# Patient Record
Sex: MALE | Race: OTHER | NOT HISPANIC OR LATINO | URBAN - METROPOLITAN AREA
[De-identification: names, ages, dates, MRNs, and addresses within clinical notes are randomized per-mention and may not be internally consistent; named-entity substitution may affect disease eponyms.]

---

## 2019-10-28 ENCOUNTER — OUTPATIENT (OUTPATIENT)
Dept: OUTPATIENT SERVICES | Facility: HOSPITAL | Age: 54
LOS: 1 days | End: 2019-10-28
Payer: MEDICAID

## 2019-10-28 VITALS
WEIGHT: 147.05 LBS | TEMPERATURE: 98 F | HEIGHT: 68 IN | DIASTOLIC BLOOD PRESSURE: 90 MMHG | OXYGEN SATURATION: 100 % | SYSTOLIC BLOOD PRESSURE: 142 MMHG | HEART RATE: 75 BPM | RESPIRATION RATE: 16 BRPM

## 2019-10-28 DIAGNOSIS — Z95.5 PRESENCE OF CORONARY ANGIOPLASTY IMPLANT AND GRAFT: Chronic | ICD-10-CM

## 2019-10-28 DIAGNOSIS — Z01.818 ENCOUNTER FOR OTHER PREPROCEDURAL EXAMINATION: ICD-10-CM

## 2019-10-28 DIAGNOSIS — I10 ESSENTIAL (PRIMARY) HYPERTENSION: ICD-10-CM

## 2019-10-28 DIAGNOSIS — E11.9 TYPE 2 DIABETES MELLITUS WITHOUT COMPLICATIONS: ICD-10-CM

## 2019-10-28 DIAGNOSIS — N18.6 END STAGE RENAL DISEASE: ICD-10-CM

## 2019-10-28 DIAGNOSIS — Z95.5 PRESENCE OF CORONARY ANGIOPLASTY IMPLANT AND GRAFT: ICD-10-CM

## 2019-10-28 DIAGNOSIS — Z95.828 PRESENCE OF OTHER VASCULAR IMPLANTS AND GRAFTS: Chronic | ICD-10-CM

## 2019-10-28 PROCEDURE — 87641 MR-STAPH DNA AMP PROBE: CPT

## 2019-10-28 PROCEDURE — 71045 X-RAY EXAM CHEST 1 VIEW: CPT

## 2019-10-28 PROCEDURE — G0463: CPT

## 2019-10-28 PROCEDURE — 71045 X-RAY EXAM CHEST 1 VIEW: CPT | Mod: 26

## 2019-10-28 PROCEDURE — 87640 STAPH A DNA AMP PROBE: CPT

## 2019-10-28 RX ORDER — SODIUM CHLORIDE 9 MG/ML
3 INJECTION INTRAMUSCULAR; INTRAVENOUS; SUBCUTANEOUS EVERY 8 HOURS
Refills: 0 | Status: DISCONTINUED | OUTPATIENT
Start: 2019-11-01 | End: 2019-11-17

## 2019-10-28 NOTE — H&P PST ADULT - NSICDXPROBLEM_GEN_ALL_CORE_FT
PROBLEM DIAGNOSES  Problem: End stage renal disease  Assessment and Plan: Right Arm Arterio venous Fistula Radiocephalic Creation    Problem: Hypertension  Assessment and Plan: Continue all BP medications as prescribed    Problem: Diabetes  Assessment and Plan:     Problem: Coronary stent patent  Assessment and Plan: Continue ASA as previously ordered

## 2019-10-28 NOTE — H&P PST ADULT - HISTORY OF PRESENT ILLNESS
55 yo male with history of HTN, Heart Attack, Stroke 2 years with residual right sided weakness, DM, Heartburn,  HA, reports the above. He is now receiving HD via Right chest Catheter. He is scheduled for Right Arm Arteriovenous Fistula Radiocephalic Creation on 11/1/19

## 2019-10-28 NOTE — H&P PST ADULT - NSICDXPASTMEDICALHX_GEN_ALL_CORE_FT
PAST MEDICAL HISTORY:  Diabetes     End stage renal disease     H/O heartburn     Heart attack     Hypertension DM sister    Stroke

## 2019-10-29 LAB
MRSA PCR RESULT.: SIGNIFICANT CHANGE UP
S AUREUS DNA NOSE QL NAA+PROBE: SIGNIFICANT CHANGE UP

## 2019-11-01 ENCOUNTER — OUTPATIENT (OUTPATIENT)
Dept: OUTPATIENT SERVICES | Facility: HOSPITAL | Age: 54
LOS: 1 days | End: 2019-11-01
Payer: MEDICAID

## 2019-11-01 VITALS
HEIGHT: 68 IN | RESPIRATION RATE: 16 BRPM | HEART RATE: 75 BPM | OXYGEN SATURATION: 100 % | SYSTOLIC BLOOD PRESSURE: 114 MMHG | TEMPERATURE: 98 F | WEIGHT: 147.05 LBS | DIASTOLIC BLOOD PRESSURE: 77 MMHG

## 2019-11-01 VITALS
OXYGEN SATURATION: 100 % | RESPIRATION RATE: 17 BRPM | TEMPERATURE: 98 F | DIASTOLIC BLOOD PRESSURE: 74 MMHG | HEART RATE: 74 BPM | SYSTOLIC BLOOD PRESSURE: 125 MMHG

## 2019-11-01 DIAGNOSIS — Z01.818 ENCOUNTER FOR OTHER PREPROCEDURAL EXAMINATION: ICD-10-CM

## 2019-11-01 DIAGNOSIS — N18.6 END STAGE RENAL DISEASE: ICD-10-CM

## 2019-11-01 DIAGNOSIS — Z95.5 PRESENCE OF CORONARY ANGIOPLASTY IMPLANT AND GRAFT: Chronic | ICD-10-CM

## 2019-11-01 DIAGNOSIS — Z95.828 PRESENCE OF OTHER VASCULAR IMPLANTS AND GRAFTS: Chronic | ICD-10-CM

## 2019-11-01 LAB
ANION GAP SERPL CALC-SCNC: 8 MMOL/L — SIGNIFICANT CHANGE UP (ref 5–17)
BUN SERPL-MCNC: 15 MG/DL — SIGNIFICANT CHANGE UP (ref 7–18)
CALCIUM SERPL-MCNC: 8.8 MG/DL — SIGNIFICANT CHANGE UP (ref 8.4–10.5)
CHLORIDE SERPL-SCNC: 98 MMOL/L — SIGNIFICANT CHANGE UP (ref 96–108)
CO2 SERPL-SCNC: 30 MMOL/L — SIGNIFICANT CHANGE UP (ref 22–31)
CREAT SERPL-MCNC: 3.97 MG/DL — HIGH (ref 0.5–1.3)
GLUCOSE BLDC GLUCOMTR-MCNC: 114 MG/DL — HIGH (ref 70–99)
GLUCOSE BLDC GLUCOMTR-MCNC: 186 MG/DL — HIGH (ref 70–99)
GLUCOSE SERPL-MCNC: 169 MG/DL — HIGH (ref 70–99)
POTASSIUM SERPL-MCNC: 4.4 MMOL/L — SIGNIFICANT CHANGE UP (ref 3.5–5.3)
POTASSIUM SERPL-SCNC: 4.4 MMOL/L — SIGNIFICANT CHANGE UP (ref 3.5–5.3)
SODIUM SERPL-SCNC: 136 MMOL/L — SIGNIFICANT CHANGE UP (ref 135–145)

## 2019-11-01 PROCEDURE — 82962 GLUCOSE BLOOD TEST: CPT

## 2019-11-01 PROCEDURE — C1889: CPT

## 2019-11-01 PROCEDURE — 80048 BASIC METABOLIC PNL TOTAL CA: CPT

## 2019-11-01 PROCEDURE — 36415 COLL VENOUS BLD VENIPUNCTURE: CPT

## 2019-11-01 PROCEDURE — 36821 AV FUSION DIRECT ANY SITE: CPT | Mod: RT

## 2019-11-01 RX ORDER — CHLORHEXIDINE GLUCONATE 213 G/1000ML
1 SOLUTION TOPICAL ONCE
Refills: 0 | Status: COMPLETED | OUTPATIENT
Start: 2019-11-01 | End: 2019-11-01

## 2019-11-01 RX ORDER — CIPROFLOXACIN LACTATE 400MG/40ML
1 VIAL (ML) INTRAVENOUS
Qty: 0 | Refills: 0 | DISCHARGE

## 2019-11-01 RX ORDER — OXYCODONE AND ACETAMINOPHEN 5; 325 MG/1; MG/1
1 TABLET ORAL ONCE
Refills: 0 | Status: DISCONTINUED | OUTPATIENT
Start: 2019-11-01 | End: 2019-11-01

## 2019-11-01 RX ORDER — SODIUM CHLORIDE 9 MG/ML
1000 INJECTION INTRAMUSCULAR; INTRAVENOUS; SUBCUTANEOUS
Refills: 0 | Status: DISCONTINUED | OUTPATIENT
Start: 2019-11-01 | End: 2019-11-01

## 2019-11-01 RX ADMIN — SODIUM CHLORIDE 3 MILLILITER(S): 9 INJECTION INTRAMUSCULAR; INTRAVENOUS; SUBCUTANEOUS at 08:34

## 2019-11-01 RX ADMIN — CHLORHEXIDINE GLUCONATE 1 APPLICATION(S): 213 SOLUTION TOPICAL at 08:33

## 2019-11-01 NOTE — ASU PATIENT PROFILE, ADULT - PMH
Diabetes    End stage renal disease    H/O heartburn    Heart attack    Hypertension  DM sister  Stroke

## 2019-11-01 NOTE — ASU DISCHARGE PLAN (ADULT/PEDIATRIC) - CARE PROVIDER_API CALL
Monroe Escudero (MD)  Surgery; Thoracic Surgery; Vascular Surgery  [address] [facility], [address]  [place] [id number]  Phone: [phone]  Fax: [phone]  Follow Up Time:

## 2019-11-01 NOTE — BRIEF OPERATIVE NOTE - OPERATION/FINDINGS
Right radiocheplic fistula creation. Good thrill palpable at end of case. Good hemostasis. Closed with simple interrupted sutures.

## 2021-01-29 PROBLEM — Z00.00 ENCOUNTER FOR PREVENTIVE HEALTH EXAMINATION: Status: ACTIVE | Noted: 2021-01-29

## 2021-01-30 PROBLEM — Z87.898 PERSONAL HISTORY OF OTHER SPECIFIED CONDITIONS: Chronic | Status: ACTIVE | Noted: 2019-10-28

## 2021-01-30 PROBLEM — I21.9 ACUTE MYOCARDIAL INFARCTION, UNSPECIFIED: Chronic | Status: ACTIVE | Noted: 2019-10-28

## 2021-02-17 ENCOUNTER — APPOINTMENT (OUTPATIENT)
Dept: TRANSPLANT | Facility: CLINIC | Age: 56
End: 2021-02-17
Payer: COMMERCIAL

## 2021-02-17 ENCOUNTER — APPOINTMENT (OUTPATIENT)
Dept: NEPHROLOGY | Facility: CLINIC | Age: 56
End: 2021-02-17
Payer: COMMERCIAL

## 2021-02-17 VITALS
TEMPERATURE: 97.2 F | DIASTOLIC BLOOD PRESSURE: 60 MMHG | WEIGHT: 158 LBS | SYSTOLIC BLOOD PRESSURE: 177 MMHG | BODY MASS INDEX: 23.95 KG/M2 | HEIGHT: 68 IN | RESPIRATION RATE: 15 BRPM | HEART RATE: 82 BPM

## 2021-02-17 PROCEDURE — 99072 ADDL SUPL MATRL&STAF TM PHE: CPT

## 2021-02-17 PROCEDURE — 99204 OFFICE O/P NEW MOD 45 MIN: CPT

## 2021-02-17 PROCEDURE — 99205 OFFICE O/P NEW HI 60 MIN: CPT

## 2021-02-17 NOTE — CONSULT LETTER
[Dear  ___] : Dear  [unfilled], [Consult Letter:] : I had the pleasure of evaluating your patient, [unfilled]. [Please see my note below.] : Please see my note below. [Consult Closing:] : Thank you very much for allowing me to participate in the care of this patient.  If you have any questions, please do not hesitate to contact me. [Sincerely,] : Sincerely, [FreeTextEntry2] : Derrell Cobb MD [FreeTextEntry3] : Antwon

## 2021-02-17 NOTE — REASON FOR VISIT
[Initial] : an initial visit for [Kidney Transplant Evaluation] : kidney transplant evaluation [FreeTextEntry2] : Derrell Cobb MD

## 2021-02-17 NOTE — REVIEW OF SYSTEMS
[Fever] : no fever [Chills] : no chills [Fatigue] : fatigue [Night Sweats] : no night sweats [Recent Weight Gain (___ Lbs)] : no recent weight gain [Recent Weight Loss (___ Lbs)] : no recent weight loss [Sore throat] : no sore throat [Pain/Stiffness] : no pain/stiffness [Rhinorrhea] : no rhinorrhea [Trauma] : no trauma [Sclera anicteric] : sclera icteric [Double vision] : no double vision [Blurred Vision] : no blurred vision [Eye Pain] : no eye pain [Wears glasses] : wears glasses [Chest Pain] : no chest pain [Palpitations] : no palpitations [Can Walk (___ Blocks)] : can walk [unfilled] blocks [SOB] : no shortness of breath [Wheezing] : no wheezing [Cough] : no cough [Dyspnea on Exertion] : dyspnea on exertion [Pleuritic Chest Pain] : no pleuritic chest pain [Abdominal Pain] : no abdominal pain [Nausea] : no nausea [Constipation] : no constipation [Diarrhea] : diarrhea [Vomiting] : no vomiting [Dysuria] : no dysuria [Frequency] : no frequency [Urgency] : no urinary urgency [Incontinence] : no incontinence [Hematuria] : no hematuria [UTI] : no UTI [Urine Output: ____] : Urine Output: [unfilled] [Joint Pain] : no joint pain [Joint Stiffness] : no joint stiffness [Muscle Pain] : no muscle pain [Muscle Weakness] : no muscle weakness [Tattoos] : no tattoos [Itching] : no itching [Skin Rash] : no skin rash [Hair Changes] : no hair changes [Headache] : no headache [Dizziness] : dizziness [Fainting] : no fainting [Confusion] : no confusion [Memory Loss] : no memory loss [Unsteady Gait] : steady gait [Seizures] : no seizures [Suicidal] : not suicidal [Hallucinations] : no hallucinations [Anxiety] : no anxiety [Depression] : no depression [Anemia] : anemia [Adenopathy] : no adenopathy [Easy Bleeding] : no easy bleeding [Easy Bruising] : no easy bruising

## 2021-02-17 NOTE — ASSESSMENT
[Fair candidate] : a fair candidate. We should proceed with our protocol for evaluation for kidney transplantation.

## 2021-02-17 NOTE — PLAN
[FreeTextEntry1] : 1. ESRD:  Mr. MAMI WOODWARD  Is a  fair candidate for kidney transplantation and would benefit from transplantation . no potential living donors. \par 2.Cardiac risk: h/o MI s/p PCI in 2011 .  needs an  echo, stress test and cardiac evaluation \par 3. Cancer screening:  colonoscopy, and check PSA \par 4. ID: Serology for acute and chronic viral infections. Screening for latent TB\par 5. Imaging: Renal/abdominal /chest /Iliac imaging. \par 6.Diabetes Mellitus:check HbA1c \par 7.Hypertension- f/u PMD \par \par I have personally discussed the risks and benefits of transplantation and patient attended transplant education class where the following was disclosed:\par  \par Reviewed factors affecting survival and morbidity while on dialysis, the transplant wait list and reviewed zackery-operative and long-term risk factors affecting outcome in kidney transplantation. \par  \par One year SRTR outcomes for national and [facility] were discussed in regards to patient survival and graft survival after transplantation. \par  \par Details of transplant surgery, including complications were discussed.\par Immunosuppression and complications including infection including life threatening sepsis and opportunistic infections, malignancy and new onset diabetes were discussed. \par  \par Benefits of live donor transplantation as well as variability in wait times across regions and multiple listing were discussed. \par KDPI >85% and PHS high risk criteria donors were discussed. \par HCV kidney transplantation was discussed.\par  \par Will proceed with completing/ updating work up and listing for transplant/ live donor transplant once work up is reviewed and found to be acceptable by multidisciplinary listing committee.\par

## 2021-02-17 NOTE — PHYSICAL EXAM
[Well Developed] : well developed [Well Nourished] : well nourished [No Acute Distress] : no acute distress [Normocephalic] : normocephalic [Atraumatic] : atraumatic [Sclera Anicteric] : sclera anicteric [Neck Supple] : neck supple [Clear to Auscultation] : clear to auscultation [Breathing Comfortably on RA] : breathing comfortably on room air [Normal Rate] : normal rate [Regular Rhythm] : regular rhythm [Soft] : soft [Non-tender] : non-tender [In Right Forearm] : fistula/graft in right forearm [] : right dorsalis pedis non-palpable [Alert] : alert [Responds to Questions Appropriately] : responds to questions appropriately [Appropriate] : appropriate [FreeTextEntry1] : NO carotid bruits [TextBox_34] : No ulcers or edema [TextBox_73] : P [TextBox_80] : Patient was not completely familiar with his age although he was able to correctly state his year of birth [TextBox_86] : No adenopathy

## 2021-02-17 NOTE — HISTORY OF PRESENT ILLNESS
[TextBox_42] : MAMI WOODWARD is a 55 year old male who presents for kidney transplant evaluation. \par Cause of ESRD : long standing DM. Known  CKD for 5 years. \par Nephrologist: Dr Cobb   ,  on IHD for 2018 ,  Access: [facility].   \par reports that he still makes good amounts of urine. \par  \par Other PMH :\par Cardiac - HTN X 20 years. h/o  MI X s/p PCI 2011. \par Pulmonary-  no h/o COPD, Asthma\par Infections- no  h/o  TB, HIV, Hepatitis  or covid \par Heme- no h/o malignancy or bleeding disorders.No DVT/PE\par Endo- has diabetes X 15 years. was on insulin, now on Januvia with retinopathy and neuropathy.  no Thyroid disease\par Neuro- h/o CVA in 2011 and 2013  with some residual right sided weakness \par Psych- no suicidal ideation, depression or anxiety. \par Sensitization events- no  previous blood transfusions or previous transplant\par No infections or hospitalizations in the last 6 months \par \par Surgical h/o : none \par \par Functional status: walks with a cane for balance  , can do 5-10 blocks and can climb 3-4 flights of stairs. he can walk without the cane but has balance issues due to balance issues. \par \par Social history: lives with sister. has 1 son who is 38 years old.  Is a non smoker, no alcohol or illicit use. retired , used to repair photocopy machines. \par Family history:  Mother had DM. No family h/o kidney disease or malignancy.\par \par \par \par

## 2021-02-17 NOTE — PHYSICAL EXAM
[General Appearance - Alert] : alert [General Appearance - In No Acute Distress] : in no acute distress [Sclera] : the sclera and conjunctiva were normal [PERRL With Normal Accommodation] : pupils were equal in size, round, and reactive to light [Extraocular Movements] : extraocular movements were intact [Outer Ear] : the ears and nose were normal in appearance [Oropharynx] : the oropharynx was normal [Neck Appearance] : the appearance of the neck was normal [Neck Cervical Mass (___cm)] : no neck mass was observed [Jugular Venous Distention Increased] : there was no jugular-venous distention [Thyroid Diffuse Enlargement] : the thyroid was not enlarged [Thyroid Nodule] : there were no palpable thyroid nodules [Auscultation Breath Sounds / Voice Sounds] : lungs were clear to auscultation bilaterally [Heart Rate And Rhythm] : heart rate was normal and rhythm regular [Heart Sounds] : normal S1 and S2 [Heart Sounds Gallop] : no gallops [Murmurs] : no murmurs [Heart Sounds Pericardial Friction Rub] : no pericardial rub [Bowel Sounds] : normal bowel sounds [Abdomen Soft] : soft [Abdomen Tenderness] : non-tender [] : no hepato-splenomegaly [Abdomen Mass (___ Cm)] : no abdominal mass palpated [No CVA Tenderness] : no ~M costovertebral angle tenderness [No Spinal Tenderness] : no spinal tenderness [Abnormal Walk] : normal gait [Nail Clubbing] : no clubbing  or cyanosis of the fingernails [Musculoskeletal - Swelling] : no joint swelling seen [Motor Tone] : muscle strength and tone were normal [Graft] : graft [Normal] : normal [Deep Tendon Reflexes (DTR)] : deep tendon reflexes were 2+ and symmetric [Sensation] : the sensory exam was normal to light touch and pinprick [No Focal Deficits] : no focal deficits [Oriented To Time, Place, And Person] : oriented to person, place, and time [Impaired Insight] : insight and judgment were intact [Affect] : the affect was normal

## 2021-02-17 NOTE — HISTORY OF PRESENT ILLNESS
[Diabetes Mellitus] : Diabetes Mellitus [Previous Kidney Transplant] : no previous kidney transplant [Cardiac History] : cardiac history [Blood Transfusion] : no prior blood transfusion [Hx of DVT/Thrombosis/Miscarriage] : no history of dvt/thrombosis/miscarriage [Diabetes] : diabetes [Retinopathy] : retinopathy [Neuropathy] : neuropathy [Annual Foot Exams] : no annual foot exams [Lower Extremity Ulcers] : no lower extremity ulcers [Insulin] : no insulin treatment [TextBox_16] : 2017 [TextBox_20] : 2015 [TextBox_24] : At age 19 [TextBox_28] : At age 19 [TextBox_30] : 5 blocks [TextBox_39] : took insulin for approximately 2 years, none now [de-identified] : 2015 - coronary artery stenting\par CVA x 2015 - right sided weakness - residual deficit - walks with cane\par Father:  - age 80 - unknown\par Mother:  - age 42 - DM\par No family history of kidney disease\par Currently not working\par Previously repaired photocopy machines.\par \par 1 son - heallthy\par Smoking: Denies\par Drinking: Denies

## 2021-02-17 NOTE — PLAN
[We should proceed with our protocol for kidney transplantation evaluation.] : We should proceed with our protocol for kidney transplantation evaluation. [TextBox_6] : 1- CT angiogram of aorta and iliacs with runoff - non-palpable DP on right and non-palpable PT on left\par 2- Carotid US - given reported history of CVA x 2.

## 2021-02-19 RX ORDER — PEG-3350, SODIUM SULFATE, SODIUM CHLORIDE, POTASSIUM CHLORIDE, SODIUM ASCORBATE AND ASCORBIC ACID 7.5-2.691G
100 KIT ORAL
Qty: 2 | Refills: 0 | Status: ACTIVE | COMMUNITY
Start: 2021-02-19 | End: 1900-01-01

## 2021-02-22 LAB
ABO + RH PNL BLD: NORMAL
ABO + RH PNL BLD: NORMAL
ALBUMIN SERPL ELPH-MCNC: 5.1 G/DL
ALP BLD-CCNC: 106 U/L
ALT SERPL-CCNC: 14 U/L
ANION GAP SERPL CALC-SCNC: 19 MMOL/L
APPEARANCE: CLEAR
AST SERPL-CCNC: 19 U/L
BACTERIA: NEGATIVE
BASOPHILS # BLD AUTO: 0.12 K/UL
BASOPHILS NFR BLD AUTO: 1.1 %
BILIRUB SERPL-MCNC: 0.5 MG/DL
BILIRUBIN URINE: NEGATIVE
BLOOD URINE: ABNORMAL
BUN SERPL-MCNC: 44 MG/DL
CALCIUM SERPL-MCNC: 9.3 MG/DL
CHLORIDE SERPL-SCNC: 91 MMOL/L
CHOLEST SERPL-MCNC: 144 MG/DL
CMV IGG SERPL QL: 8.3 U/ML
CMV IGG SERPL-IMP: POSITIVE
CO2 SERPL-SCNC: 27 MMOL/L
COLOR: YELLOW
CREAT SERPL-MCNC: 7.31 MG/DL
CREAT SPEC-SCNC: 181 MG/DL
CREAT/PROT UR: 3.4 RATIO
EBV EA AB SER IA-ACNC: <5 U/ML
EBV EA AB TITR SER IF: POSITIVE
EBV EA IGG SER QL IA: 558 U/ML
EBV EA IGG SER-ACNC: NEGATIVE
EBV EA IGM SER IA-ACNC: NEGATIVE
EBV PATRN SPEC IB-IMP: NORMAL
EBV VCA IGG SER IA-ACNC: >750 U/ML
EBV VCA IGM SER QL IA: <10 U/ML
EOSINOPHIL # BLD AUTO: 0.45 K/UL
EOSINOPHIL NFR BLD AUTO: 4.2 %
EPSTEIN-BARR VIRUS CAPSID ANTIGEN IGG: POSITIVE
GLUCOSE QUALITATIVE U: NEGATIVE
GLUCOSE SERPL-MCNC: 125 MG/DL
HAV IGM SER QL: NONREACTIVE
HBV CORE IGG+IGM SER QL: NONREACTIVE
HBV SURFACE AB SER QL: REACTIVE
HBV SURFACE AB SERPL IA-ACNC: 95.5 MIU/ML
HBV SURFACE AG SER QL: NONREACTIVE
HCT VFR BLD CALC: 42.2 %
HCV AB SER QL: NONREACTIVE
HCV S/CO RATIO: 0.28 S/CO
HDLC SERPL-MCNC: 36 MG/DL
HGB BLD-MCNC: 14 G/DL
HIV1+2 AB SPEC QL IA.RAPID: NONREACTIVE
HSV 1+2 IGG SER IA-IMP: NEGATIVE
HSV 1+2 IGG SER IA-IMP: POSITIVE
HSV1 IGG SER QL: 56.2 INDEX
HSV2 IGG SER QL: 0.24 INDEX
HYALINE CASTS: 3 /LPF
IMM GRANULOCYTES NFR BLD AUTO: 0.4 %
KETONES URINE: NEGATIVE
LDLC SERPL CALC-MCNC: 70 MG/DL
LEUKOCYTE ESTERASE URINE: NEGATIVE
LYMPHOCYTES # BLD AUTO: 2.14 K/UL
LYMPHOCYTES NFR BLD AUTO: 19.8 %
M TB IFN-G BLD-IMP: NEGATIVE
MAGNESIUM SERPL-MCNC: 2.6 MG/DL
MAN DIFF?: NORMAL
MCHC RBC-ENTMCNC: 29.8 PG
MCHC RBC-ENTMCNC: 33.2 GM/DL
MCV RBC AUTO: 89.8 FL
MICROSCOPIC-UA: NORMAL
MONOCYTES # BLD AUTO: 1.08 K/UL
MONOCYTES NFR BLD AUTO: 10 %
NEUTROPHILS # BLD AUTO: 6.96 K/UL
NEUTROPHILS NFR BLD AUTO: 64.5 %
NITRITE URINE: NEGATIVE
NONHDLC SERPL-MCNC: 107 MG/DL
PH URINE: 6.5
PHOSPHATE SERPL-MCNC: 4.6 MG/DL
PLATELET # BLD AUTO: 326 K/UL
POTASSIUM SERPL-SCNC: 4.1 MMOL/L
PROT SERPL-MCNC: 8.5 G/DL
PROT UR-MCNC: 614 MG/DL
PROTEIN URINE: ABNORMAL
PSA SERPL-MCNC: 1.58 NG/ML
QUANTIFERON TB PLUS MITOGEN MINUS NIL: 9.96 IU/ML
QUANTIFERON TB PLUS NIL: 0.02 IU/ML
QUANTIFERON TB PLUS TB1 MINUS NIL: 0.01 IU/ML
QUANTIFERON TB PLUS TB2 MINUS NIL: 0.01 IU/ML
RBC # BLD: 4.7 M/UL
RBC # FLD: 12.3 %
RED BLOOD CELLS URINE: 2 /HPF
RUBV IGG FLD-ACNC: 32.6 INDEX
RUBV IGG SER-IMP: POSITIVE
SARS-COV-2 IGG SERPL IA-ACNC: 0.06 INDEX
SARS-COV-2 IGG SERPL QL IA: NEGATIVE
SODIUM SERPL-SCNC: 137 MMOL/L
SPECIFIC GRAVITY URINE: 1.02
SQUAMOUS EPITHELIAL CELLS: 2 /HPF
T GONDII AB SER-IMP: POSITIVE
T GONDII IGG SER QL: 27.7 IU/ML
T PALLIDUM AB SER QL IA: NEGATIVE
TRIGL SERPL-MCNC: 185 MG/DL
UROBILINOGEN URINE: NORMAL
VZV AB TITR SER: POSITIVE
VZV IGG SER IF-ACNC: 3502 INDEX
WBC # FLD AUTO: 10.79 K/UL
WHITE BLOOD CELLS URINE: 4 /HPF

## 2021-02-23 LAB — EBV DNA SERPL NAA+PROBE-ACNC: NOT DETECTED IU/ML

## 2021-03-17 ENCOUNTER — APPOINTMENT (OUTPATIENT)
Dept: CT IMAGING | Facility: CLINIC | Age: 56
End: 2021-03-17

## 2021-03-17 ENCOUNTER — OUTPATIENT (OUTPATIENT)
Dept: OUTPATIENT SERVICES | Facility: HOSPITAL | Age: 56
LOS: 1 days | End: 2021-03-17
Payer: COMMERCIAL

## 2021-03-17 ENCOUNTER — RESULT REVIEW (OUTPATIENT)
Age: 56
End: 2021-03-17

## 2021-03-17 ENCOUNTER — APPOINTMENT (OUTPATIENT)
Dept: ULTRASOUND IMAGING | Facility: CLINIC | Age: 56
End: 2021-03-17

## 2021-03-17 DIAGNOSIS — Z01.818 ENCOUNTER FOR OTHER PREPROCEDURAL EXAMINATION: ICD-10-CM

## 2021-03-17 DIAGNOSIS — Z95.5 PRESENCE OF CORONARY ANGIOPLASTY IMPLANT AND GRAFT: Chronic | ICD-10-CM

## 2021-03-17 DIAGNOSIS — Z95.828 PRESENCE OF OTHER VASCULAR IMPLANTS AND GRAFTS: Chronic | ICD-10-CM

## 2021-03-17 PROCEDURE — 93880 EXTRACRANIAL BILAT STUDY: CPT

## 2021-03-17 PROCEDURE — 93880 EXTRACRANIAL BILAT STUDY: CPT | Mod: 26

## 2021-03-17 PROCEDURE — 75635 CT ANGIO ABDOMINAL ARTERIES: CPT | Mod: 26

## 2021-03-17 PROCEDURE — 71250 CT THORAX DX C-: CPT | Mod: 26

## 2021-03-17 PROCEDURE — 74177 CT ABD & PELVIS W/CONTRAST: CPT

## 2021-03-17 PROCEDURE — 71250 CT THORAX DX C-: CPT

## 2021-03-17 PROCEDURE — 75635 CT ANGIO ABDOMINAL ARTERIES: CPT

## 2021-03-17 PROCEDURE — 74177 CT ABD & PELVIS W/CONTRAST: CPT | Mod: 26

## 2021-03-22 ENCOUNTER — APPOINTMENT (OUTPATIENT)
Dept: CARDIOLOGY | Facility: CLINIC | Age: 56
End: 2021-03-22
Payer: COMMERCIAL

## 2021-03-22 ENCOUNTER — NON-APPOINTMENT (OUTPATIENT)
Age: 56
End: 2021-03-22

## 2021-03-22 VITALS — DIASTOLIC BLOOD PRESSURE: 90 MMHG | SYSTOLIC BLOOD PRESSURE: 160 MMHG

## 2021-03-22 VITALS
DIASTOLIC BLOOD PRESSURE: 100 MMHG | WEIGHT: 161 LBS | SYSTOLIC BLOOD PRESSURE: 197 MMHG | OXYGEN SATURATION: 99 % | BODY MASS INDEX: 24.48 KG/M2 | HEART RATE: 78 BPM

## 2021-03-22 DIAGNOSIS — I67.9 CEREBROVASCULAR DISEASE, UNSPECIFIED: ICD-10-CM

## 2021-03-22 PROCEDURE — 99072 ADDL SUPL MATRL&STAF TM PHE: CPT

## 2021-03-22 PROCEDURE — 93000 ELECTROCARDIOGRAM COMPLETE: CPT

## 2021-03-22 PROCEDURE — 99204 OFFICE O/P NEW MOD 45 MIN: CPT

## 2021-03-22 NOTE — REVIEW OF SYSTEMS
[Feeling Fatigued] : feeling fatigued [Dyspnea on exertion] : dyspnea during exertion [Limb Weakness (Paresis)] : limb weakness [Numbness (Hypesthesia)] : numbness [Memory Lapses Or Loss] : memory lapses or loss [Negative] : Heme/Lymph

## 2021-03-24 ENCOUNTER — OUTPATIENT (OUTPATIENT)
Dept: OUTPATIENT SERVICES | Facility: HOSPITAL | Age: 56
LOS: 1 days | End: 2021-03-24
Payer: COMMERCIAL

## 2021-03-24 ENCOUNTER — NON-APPOINTMENT (OUTPATIENT)
Age: 56
End: 2021-03-24

## 2021-03-24 VITALS
OXYGEN SATURATION: 99 % | HEIGHT: 68 IN | DIASTOLIC BLOOD PRESSURE: 88 MMHG | TEMPERATURE: 98 F | WEIGHT: 160.06 LBS | RESPIRATION RATE: 16 BRPM | SYSTOLIC BLOOD PRESSURE: 158 MMHG | HEART RATE: 86 BPM

## 2021-03-24 DIAGNOSIS — Z95.828 PRESENCE OF OTHER VASCULAR IMPLANTS AND GRAFTS: Chronic | ICD-10-CM

## 2021-03-24 DIAGNOSIS — Z01.818 ENCOUNTER FOR OTHER PREPROCEDURAL EXAMINATION: ICD-10-CM

## 2021-03-24 DIAGNOSIS — Z95.5 PRESENCE OF CORONARY ANGIOPLASTY IMPLANT AND GRAFT: Chronic | ICD-10-CM

## 2021-03-24 DIAGNOSIS — Z98.890 OTHER SPECIFIED POSTPROCEDURAL STATES: Chronic | ICD-10-CM

## 2021-03-24 LAB
ANION GAP SERPL CALC-SCNC: 20 MMOL/L — HIGH (ref 5–17)
BUN SERPL-MCNC: 42 MG/DL — HIGH (ref 7–23)
CALCIUM SERPL-MCNC: 9.5 MG/DL — SIGNIFICANT CHANGE UP (ref 8.4–10.5)
CHLORIDE SERPL-SCNC: 94 MMOL/L — LOW (ref 96–108)
CO2 SERPL-SCNC: 25 MMOL/L — SIGNIFICANT CHANGE UP (ref 22–31)
CREAT SERPL-MCNC: 6.7 MG/DL — HIGH (ref 0.5–1.3)
GLUCOSE SERPL-MCNC: 82 MG/DL — SIGNIFICANT CHANGE UP (ref 70–99)
HCT VFR BLD CALC: 40.2 % — SIGNIFICANT CHANGE UP (ref 39–50)
HGB BLD-MCNC: 13.2 G/DL — SIGNIFICANT CHANGE UP (ref 13–17)
MCHC RBC-ENTMCNC: 29.3 PG — SIGNIFICANT CHANGE UP (ref 27–34)
MCHC RBC-ENTMCNC: 32.8 GM/DL — SIGNIFICANT CHANGE UP (ref 32–36)
MCV RBC AUTO: 89.3 FL — SIGNIFICANT CHANGE UP (ref 80–100)
NRBC # BLD: 0 /100 WBCS — SIGNIFICANT CHANGE UP (ref 0–0)
PLATELET # BLD AUTO: 356 K/UL — SIGNIFICANT CHANGE UP (ref 150–400)
POTASSIUM SERPL-MCNC: 4.1 MMOL/L — SIGNIFICANT CHANGE UP (ref 3.5–5.3)
POTASSIUM SERPL-SCNC: 4.1 MMOL/L — SIGNIFICANT CHANGE UP (ref 3.5–5.3)
RBC # BLD: 4.5 M/UL — SIGNIFICANT CHANGE UP (ref 4.2–5.8)
RBC # FLD: 12.3 % — SIGNIFICANT CHANGE UP (ref 10.3–14.5)
SODIUM SERPL-SCNC: 139 MMOL/L — SIGNIFICANT CHANGE UP (ref 135–145)
WBC # BLD: 9.25 K/UL — SIGNIFICANT CHANGE UP (ref 3.8–10.5)
WBC # FLD AUTO: 9.25 K/UL — SIGNIFICANT CHANGE UP (ref 3.8–10.5)

## 2021-03-24 PROCEDURE — G0463: CPT

## 2021-03-24 PROCEDURE — 85027 COMPLETE CBC AUTOMATED: CPT

## 2021-03-24 PROCEDURE — 83036 HEMOGLOBIN GLYCOSYLATED A1C: CPT

## 2021-03-24 PROCEDURE — 80048 BASIC METABOLIC PNL TOTAL CA: CPT

## 2021-03-24 RX ORDER — NIFEDIPINE 30 MG
1 TABLET, EXTENDED RELEASE 24 HR ORAL
Qty: 0 | Refills: 0 | DISCHARGE

## 2021-03-24 RX ORDER — FUROSEMIDE 40 MG
1 TABLET ORAL
Qty: 0 | Refills: 0 | DISCHARGE

## 2021-03-24 RX ORDER — CALCIUM ACETATE 667 MG
3 TABLET ORAL
Qty: 0 | Refills: 0 | DISCHARGE

## 2021-03-24 RX ORDER — CARVEDILOL PHOSPHATE 80 MG/1
1 CAPSULE, EXTENDED RELEASE ORAL
Qty: 0 | Refills: 0 | DISCHARGE

## 2021-03-24 RX ORDER — ACETAMINOPHEN 500 MG
2 TABLET ORAL
Qty: 0 | Refills: 0 | DISCHARGE

## 2021-03-24 RX ORDER — INSULIN DETEMIR 100/ML (3)
10 INSULIN PEN (ML) SUBCUTANEOUS
Qty: 0 | Refills: 0 | DISCHARGE

## 2021-03-24 NOTE — H&P PST ADULT - NSICDXPASTMEDICALHX_GEN_ALL_CORE_FT
PAST MEDICAL HISTORY:  Diabetes     End stage renal disease     H/O heartburn     Heart attack     Hypertension DM sister    Stroke PAST MEDICAL HISTORY:  Diabetes     End stage renal disease     H/O heartburn     Heart attack     Hypertension DM sister    Stroke      PAST MEDICAL HISTORY:  Diabetes     End stage renal disease HD on Tu-Th-Sat    H/O heartburn     Heart attack     Hypertension     Stroke

## 2021-03-24 NOTE — H&P PST ADULT - NSICDXPASTSURGICALHX_GEN_ALL_CORE_FT
PAST SURGICAL HISTORY:  S/P dialysis catheter insertion Right chest    Stented coronary artery PAST SURGICAL HISTORY:  S/P dialysis catheter insertion Right chest since removed    Stented coronary artery 2011     PAST SURGICAL HISTORY:  S/P arteriovenous (AV) fistula creation 2019    S/P dialysis catheter insertion Right chest since removed    Stented coronary artery 2011

## 2021-03-24 NOTE — H&P PST ADULT - HISTORY OF PRESENT ILLNESS
53 yo male with history of HTN, Heart Attack, Stroke 2 years with residual right sided weakness, DM, Heartburn,  HA, reports the above. He is now receiving HD via Right chest Catheter. He is scheduled for Right Arm Arteriovenous Fistula Radiocephalic Creation on 11/1/19 55 yo male with history of HTN, Heart Attack, Stroke 2 years with residual right sided weakness, DM, Heartburn,  HA, reports the above. He is now receiving HD via Right chest Catheter. He is scheduled for Right Arm Arteriovenous Fistula Radiocephalic Creation on 11/1/19     [facility]-Th-Sat  55 yo male with history of HTN, CAD s/p MI, s/p PCI with stent placement, on Plavix and ASA, s/p CVA with residual right sided weakness, DM 9last A1c unknown), GERD, with ESRD on HD (Via right AV fistula), being evaluated for a kidney transplant. Reporting some forgetfulness, he states he's getting put on the list for a kidney transplant and does not why he is here in PST or for what procedure he's going for. Patient came alone today by transport. Procedure and date of procedure, confirmed with surgical coordinator. Patient is scheduled for colonoscopy anesthesia on 4/7 with Dr. Ospina. Procedure also confirmed with sister Dianne Caballero, as well.     ***  Appt for COVID PCR made by PST for 4/4 @ 10:05. Sister made aware

## 2021-03-24 NOTE — H&P PST ADULT - DENTITION
Missing teeth bilateral upper and lower mouth Missing teeth bilateral upper and lower mouth; Denies dentures, broken and loose teeth

## 2021-03-25 LAB
A1C WITH ESTIMATED AVERAGE GLUCOSE RESULT: 6 % — HIGH (ref 4–5.6)
ESTIMATED AVERAGE GLUCOSE: 126 MG/DL — HIGH (ref 68–114)

## 2021-03-26 ENCOUNTER — APPOINTMENT (OUTPATIENT)
Dept: CARDIOLOGY | Facility: CLINIC | Age: 56
End: 2021-03-26
Payer: COMMERCIAL

## 2021-03-26 PROCEDURE — 93306 TTE W/DOPPLER COMPLETE: CPT

## 2021-03-26 PROCEDURE — 99072 ADDL SUPL MATRL&STAF TM PHE: CPT

## 2021-03-29 ENCOUNTER — APPOINTMENT (OUTPATIENT)
Dept: VASCULAR SURGERY | Facility: CLINIC | Age: 56
End: 2021-03-29
Payer: MEDICARE

## 2021-03-29 VITALS
SYSTOLIC BLOOD PRESSURE: 160 MMHG | TEMPERATURE: 97.4 F | HEART RATE: 83 BPM | DIASTOLIC BLOOD PRESSURE: 77 MMHG | WEIGHT: 175 LBS | HEIGHT: 68 IN | BODY MASS INDEX: 26.52 KG/M2

## 2021-03-29 PROCEDURE — 99072 ADDL SUPL MATRL&STAF TM PHE: CPT

## 2021-03-29 PROCEDURE — 99204 OFFICE O/P NEW MOD 45 MIN: CPT

## 2021-03-29 NOTE — PHYSICAL EXAM
[de-identified] : On physical examination the patient is in no acute distress and neurologically intact. The lungs are clear to auscultation and the heart has a regular rate and rhythm. Abdomen is benign. Bilateral femoral and pedal pulses are palpable. \par \par

## 2021-03-29 NOTE — HISTORY OF PRESENT ILLNESS
[FreeTextEntry1] : I have just had the pleasure of seeing Mr. Osiris Ashby in consultation for severe right carotid artery stenosis. History is primarily obtained from sister Dianne, as patient is poor historian.\par  \par Mr. Ashby is a pleasant 55-year-old gentleman with a history of stroke 3-4 years ago, treated at [facility] in [place] which resulted in right sided weakness and difficulty with memory and swallowing. Patient unsure of type of stroke or treatment received. He has largely recovered motor function but still reports some difficulty with fine motor skills in the right hand and swallowing. His sister reports problems with memory. He is being evaluated for renal transplant and as part of his workup, he underwent a carotid duplex which showed >70% right carotid stenosis and pre-obstructive flow pattern in the right vertebral artery. \par \par His medical history is significant for ESRD on HD via right arm fistula, CAD s/p PCI/stents, HTN, and HLD\par \par Medications: Lipitor, Auryxia, Coreg, Plavix, Famotidine, Lasix, Hydralazine, Januvia, Nifedipine, Aspirin (325mg), Nebulizers\par \par Allergies: NKDA\par \par Social history: Non-smoker\par \par FH: NC\par

## 2021-03-29 NOTE — ASSESSMENT
[FreeTextEntry1] : In summary, Mr. Ashby presents with asymptomatic right carotid artery stenosis. I will send him for a CTA head/neck to better delineate his anatomy. I will attempt to obtain medical records from [facility] regarding his prior stroke. \par \par Thank you for allowing me to participate in the care of this nice man. Please do not hesitate to contact me with any questions. \par   \par

## 2021-03-30 PROBLEM — I10 ESSENTIAL (PRIMARY) HYPERTENSION: Chronic | Status: ACTIVE | Noted: 2019-10-28

## 2021-03-31 ENCOUNTER — APPOINTMENT (OUTPATIENT)
Dept: CARDIOLOGY | Facility: CLINIC | Age: 56
End: 2021-03-31
Payer: COMMERCIAL

## 2021-03-31 PROCEDURE — 93015 CV STRESS TEST SUPVJ I&R: CPT

## 2021-03-31 PROCEDURE — 78452 HT MUSCLE IMAGE SPECT MULT: CPT

## 2021-03-31 PROCEDURE — A9500: CPT

## 2021-03-31 PROCEDURE — 99072 ADDL SUPL MATRL&STAF TM PHE: CPT

## 2021-04-03 DIAGNOSIS — Z01.818 ENCOUNTER FOR OTHER PREPROCEDURAL EXAMINATION: ICD-10-CM

## 2021-04-04 ENCOUNTER — APPOINTMENT (OUTPATIENT)
Dept: DISASTER EMERGENCY | Facility: CLINIC | Age: 56
End: 2021-04-04

## 2021-04-05 ENCOUNTER — OUTPATIENT (OUTPATIENT)
Dept: OUTPATIENT SERVICES | Facility: HOSPITAL | Age: 56
LOS: 1 days | End: 2021-04-05
Payer: COMMERCIAL

## 2021-04-05 DIAGNOSIS — Z98.890 OTHER SPECIFIED POSTPROCEDURAL STATES: Chronic | ICD-10-CM

## 2021-04-05 DIAGNOSIS — Z95.828 PRESENCE OF OTHER VASCULAR IMPLANTS AND GRAFTS: Chronic | ICD-10-CM

## 2021-04-05 DIAGNOSIS — Z95.5 PRESENCE OF CORONARY ANGIOPLASTY IMPLANT AND GRAFT: Chronic | ICD-10-CM

## 2021-04-05 DIAGNOSIS — Z11.52 ENCOUNTER FOR SCREENING FOR COVID-19: ICD-10-CM

## 2021-04-05 LAB — SARS-COV-2 RNA SPEC QL NAA+PROBE: SIGNIFICANT CHANGE UP

## 2021-04-05 PROCEDURE — U0003: CPT

## 2021-04-05 PROCEDURE — C9803: CPT

## 2021-04-05 PROCEDURE — U0005: CPT

## 2021-04-07 ENCOUNTER — APPOINTMENT (OUTPATIENT)
Dept: HEPATOLOGY | Facility: HOSPITAL | Age: 56
End: 2021-04-07

## 2021-04-12 ENCOUNTER — APPOINTMENT (OUTPATIENT)
Dept: CT IMAGING | Facility: IMAGING CENTER | Age: 56
End: 2021-04-12
Payer: MEDICARE

## 2021-04-12 ENCOUNTER — OUTPATIENT (OUTPATIENT)
Dept: OUTPATIENT SERVICES | Facility: HOSPITAL | Age: 56
LOS: 1 days | End: 2021-04-12
Payer: COMMERCIAL

## 2021-04-12 DIAGNOSIS — Z00.8 ENCOUNTER FOR OTHER GENERAL EXAMINATION: ICD-10-CM

## 2021-04-12 DIAGNOSIS — Z95.5 PRESENCE OF CORONARY ANGIOPLASTY IMPLANT AND GRAFT: Chronic | ICD-10-CM

## 2021-04-12 DIAGNOSIS — Z95.828 PRESENCE OF OTHER VASCULAR IMPLANTS AND GRAFTS: Chronic | ICD-10-CM

## 2021-04-12 DIAGNOSIS — Z98.890 OTHER SPECIFIED POSTPROCEDURAL STATES: Chronic | ICD-10-CM

## 2021-04-12 PROCEDURE — 70496 CT ANGIOGRAPHY HEAD: CPT | Mod: 26

## 2021-04-12 PROCEDURE — 70498 CT ANGIOGRAPHY NECK: CPT

## 2021-04-12 PROCEDURE — 70498 CT ANGIOGRAPHY NECK: CPT | Mod: 26

## 2021-04-12 PROCEDURE — 70496 CT ANGIOGRAPHY HEAD: CPT

## 2021-04-19 ENCOUNTER — FORM ENCOUNTER (OUTPATIENT)
Age: 56
End: 2021-04-19

## 2021-04-19 ENCOUNTER — OUTPATIENT (OUTPATIENT)
Dept: OUTPATIENT SERVICES | Facility: HOSPITAL | Age: 56
LOS: 1 days | End: 2021-04-19

## 2021-04-19 VITALS
HEART RATE: 93 BPM | HEIGHT: 68 IN | TEMPERATURE: 97 F | RESPIRATION RATE: 14 BRPM | DIASTOLIC BLOOD PRESSURE: 90 MMHG | OXYGEN SATURATION: 99 % | SYSTOLIC BLOOD PRESSURE: 150 MMHG | WEIGHT: 160.06 LBS

## 2021-04-19 DIAGNOSIS — Z95.828 PRESENCE OF OTHER VASCULAR IMPLANTS AND GRAFTS: Chronic | ICD-10-CM

## 2021-04-19 DIAGNOSIS — I65.23 OCCLUSION AND STENOSIS OF BILATERAL CAROTID ARTERIES: ICD-10-CM

## 2021-04-19 DIAGNOSIS — Z98.890 OTHER SPECIFIED POSTPROCEDURAL STATES: Chronic | ICD-10-CM

## 2021-04-19 DIAGNOSIS — Z95.5 PRESENCE OF CORONARY ANGIOPLASTY IMPLANT AND GRAFT: Chronic | ICD-10-CM

## 2021-04-19 LAB
ALBUMIN SERPL ELPH-MCNC: 5 G/DL — SIGNIFICANT CHANGE UP (ref 3.3–5)
ALP SERPL-CCNC: 104 U/L — SIGNIFICANT CHANGE UP (ref 40–120)
ALT FLD-CCNC: 19 U/L — SIGNIFICANT CHANGE UP (ref 4–41)
ANION GAP SERPL CALC-SCNC: 22 MMOL/L — HIGH (ref 7–14)
AST SERPL-CCNC: 33 U/L — SIGNIFICANT CHANGE UP (ref 4–40)
BILIRUB SERPL-MCNC: 0.4 MG/DL — SIGNIFICANT CHANGE UP (ref 0.2–1.2)
BLD GP AB SCN SERPL QL: NEGATIVE — SIGNIFICANT CHANGE UP
BUN SERPL-MCNC: 53 MG/DL — HIGH (ref 7–23)
CALCIUM SERPL-MCNC: 9 MG/DL — SIGNIFICANT CHANGE UP (ref 8.4–10.5)
CHLORIDE SERPL-SCNC: 93 MMOL/L — LOW (ref 98–107)
CO2 SERPL-SCNC: 22 MMOL/L — SIGNIFICANT CHANGE UP (ref 22–31)
CREAT SERPL-MCNC: 8.4 MG/DL — HIGH (ref 0.5–1.3)
GLUCOSE SERPL-MCNC: 110 MG/DL — HIGH (ref 70–99)
PA ADP PRP-ACNC: 235 PRU
POTASSIUM SERPL-MCNC: 3.9 MMOL/L — SIGNIFICANT CHANGE UP (ref 3.5–5.3)
POTASSIUM SERPL-SCNC: 3.9 MMOL/L — SIGNIFICANT CHANGE UP (ref 3.5–5.3)
PROT SERPL-MCNC: 8.2 G/DL — SIGNIFICANT CHANGE UP (ref 6–8.3)
RH IG SCN BLD-IMP: POSITIVE — SIGNIFICANT CHANGE UP
SODIUM SERPL-SCNC: 137 MMOL/L — SIGNIFICANT CHANGE UP (ref 135–145)

## 2021-04-19 RX ORDER — FUROSEMIDE 40 MG
1 TABLET ORAL
Qty: 0 | Refills: 0 | DISCHARGE

## 2021-04-19 RX ORDER — SODIUM CHLORIDE 9 MG/ML
1000 INJECTION INTRAMUSCULAR; INTRAVENOUS; SUBCUTANEOUS
Refills: 0 | Status: DISCONTINUED | OUTPATIENT
Start: 2021-04-27 | End: 2021-04-28

## 2021-04-19 RX ORDER — MULTIVIT-MIN/FERROUS GLUCONATE 9 MG/15 ML
0 LIQUID (ML) ORAL
Qty: 0 | Refills: 0 | DISCHARGE

## 2021-04-19 RX ORDER — MULTIVIT-MIN/FERROUS GLUCONATE 9 MG/15 ML
1 LIQUID (ML) ORAL
Qty: 0 | Refills: 0 | DISCHARGE

## 2021-04-19 RX ORDER — FERRIC CITRATE 210 MG/1
2 TABLET, COATED ORAL
Qty: 0 | Refills: 0 | DISCHARGE

## 2021-04-19 RX ORDER — ERGOCALCIFEROL 1.25 MG/1
1 CAPSULE ORAL
Qty: 0 | Refills: 0 | DISCHARGE

## 2021-04-19 RX ORDER — FAMOTIDINE 10 MG/ML
1 INJECTION INTRAVENOUS
Qty: 0 | Refills: 0 | DISCHARGE

## 2021-04-19 RX ORDER — SODIUM CHLORIDE 9 MG/ML
3 INJECTION INTRAMUSCULAR; INTRAVENOUS; SUBCUTANEOUS EVERY 8 HOURS
Refills: 0 | Status: DISCONTINUED | OUTPATIENT
Start: 2021-04-27 | End: 2021-04-28

## 2021-04-19 RX ORDER — ATORVASTATIN CALCIUM 80 MG/1
1 TABLET, FILM COATED ORAL
Qty: 0 | Refills: 0 | DISCHARGE

## 2021-04-19 NOTE — H&P PST ADULT - NEUROLOGICAL SYMPTOMS
numbness and tingling in bilateral feet/weakness/paresthesias/difficulty walking/hemiparesis/confusion

## 2021-04-19 NOTE — H&P PST ADULT - NSICDXFAMILYHX_GEN_ALL_CORE_FT
FAMILY HISTORY:  FH: type 2 diabetes, Mother, Sisters    Mother  Still living? No  FH: heart disease, Age at diagnosis: Age Unknown

## 2021-04-19 NOTE — H&P PST ADULT - MENTAL STATUS
disoriented to time- states year states its 1991  oriented to person and place disoriented to time- states year is 1991  oriented to person and place

## 2021-04-19 NOTE — H&P PST ADULT - GENITOURINARY COMMENTS
ON HD for renal failure, makes "little urine, normal color" on HD- [facility] dialysis T, TH, Sat via right AVF ESRD,  makes "little urine, normal color" on HD- [facility] dialysis T, TH, Sat via right AVF

## 2021-04-19 NOTE — H&P PST ADULT - NSICDXPASTMEDICALHX_GEN_ALL_CORE_FT
PAST MEDICAL HISTORY:  Diabetes     End stage renal disease HD on Tu-Th-Sat right AVF    H/O heartburn     Heart attack     HLD (hyperlipidemia)     Hypertension     Stroke right sided weaknes     PAST MEDICAL HISTORY:  CAD (coronary artery disease) s/p coronary stents    Diabetes     End stage renal disease HD on Tu-Th-Sat right AVF    H/O heartburn     Heart attack     HLD (hyperlipidemia)     Hypertension     Occlusion and stenosis of bilateral carotid arteries     Stroke right sided weaknes

## 2021-04-19 NOTE — H&P PST ADULT - CARDIOVASCULAR COMMENTS
bilateral "weakness" when doing activities, patient mostly home, patient states "foot is weak when walking up stairs or walking to kitchen" bilateral "weakness" when doing activities, patient mostly home, patient states "feet weakness when walking up stairs or walking to kitchen"

## 2021-04-19 NOTE — H&P PST ADULT - NEGATIVE ENMT SYMPTOMS
no hearing difficulty/no ear pain/no tinnitus/no vertigo/no nasal congestion/no dry mouth/no throat pain

## 2021-04-19 NOTE — H&P PST ADULT - NSICDXPASTSURGICALHX_GEN_ALL_CORE_FT
PAST SURGICAL HISTORY:  S/P arteriovenous (AV) fistula creation 2019 right AVF    S/P dialysis catheter insertion Right chest since removed    Stented coronary artery 2011

## 2021-04-19 NOTE — H&P PST ADULT - ASSESSMENT
Problem: occlusion and stenosis of bilateral carotid arteries   Assessment and Plan: Patient scheduled for surgery on 4/27/21  Patient provided with verbal and written presurgical instructions; verbalized understanding  with teach back.    Patient provided with famotidine for GI prophylaxis; verbalized understanding.    Patient provided with Chlorhexidine wash, verbal and written instructions reviewed. Patient demonstrated understanding with teach back.   Patient confirmed COVID appointment     STOP BANG score met, OR booking notified  OR booking notified of DM, AVF, implants,     EKG, Echo and Stress test in chart     Medical evaluation requested by PST for poor historian, low METs, patient and sister verbalized, will obtain   Cardiac evaluation requested by PST for abnormal stress test, SYLVESTER, elevated BP,  patient and sister verbalized understanding, will obtain  Case discussed with Dr. Alex Miller and surgeon called to discuss cancelled angiogram     Problem: Hypertension  Assessment and Plan: Patient instructed to take coreg, hydralazine, nifedipine on day of procedure, verbalized understanding.    Problem: CAD  Plan: Instructed to remain on aspirin, Plavix as per surgeon request     Problem: Renal failure   Plan: Instructed to hold calcium phosphate, hold Jose, Renaplex one week prior to surgery     Problem: Diabetes  Assessment and Plan: Patient instructed on medications adjustments: hold januvia DOS   OR booking notified of DM  POCT glucose testing upon admission    Problem: occlusion and stenosis of bilateral carotid arteries   Assessment and Plan: Patient scheduled for surgery on 4/27/21  Patient provided with verbal and written presurgical instructions; verbalized understanding  with teach back.    Patient provided with famotidine for GI prophylaxis; verbalized understanding.    Patient provided with Chlorhexidine wash, verbal and written instructions reviewed. Patient demonstrated understanding with teach back.   Patient confirmed COVID appointment     STOP BANG score met, OR booking notified  OR booking notified of DM, AVF, implants,     EKG, Echo and Stress test in chart     Medical evaluation requested by PST for poor historian, low METs, patient and sister verbalized, will obtain   Cardiac evaluation requested by PST for abnormal stress test, SYLVESTER, elevated BP,  patient and sister verbalized understanding, will obtain  Case discussed with Dr. Alex Miller and surgeon called to discuss angiogram to be rescheduled- as per Dr Miller he informed cath lab- no information yet   Surgeon, cardiologist emailed with pending angiogram prior to surgery     Problem: Hypertension  Assessment and Plan: Patient instructed to take coreg, hydralazine, nifedipine on day of procedure, verbalized understanding.    Problem: CAD  Plan: Instructed to remain on aspirin, Plavix as per surgeon request     Problem: Renal failure   Plan: Instructed to hold calcium phosphate, hold Jose, Renaplex one week prior to surgery     Problem: Diabetes  Assessment and Plan: Patient instructed on medications adjustments: hold januvia DOS   OR booking notified of DM  POCT glucose testing upon admission

## 2021-04-19 NOTE — H&P PST ADULT - [facility]S HE PE MLT D E PC
Problem: Patient Care Overview  Goal: Plan of Care Review      Outcome: Ongoing (interventions implemented as appropriate)  Pt. Is aware of plan of care to await placement at nursing facility.  Pt. No longer required thickened fluids and has been able to take her pills with thin liquids - one at a time.  Pt. Also has started to use the bedside commode periodically with assistance and has had less incidences of urinary and fecal incontinence.  Pt. Has been weaned off of O2, but states like to have O2 on for comfort.  Nurse placed O2 on 1 liter for patient comfort.         regular rate and rhythm/no murmur

## 2021-04-19 NOTE — H&P PST ADULT - HISTORY OF PRESENT ILLNESS
54 yo male with history of HTN, CAD s/p MI, s/p PCI with stent placement 2017, on Plavix and ASA, s/p CVA with residual right sided weakness, DM, GERD, with ESRD on HD (Via right AV fistula), being evaluated for a kidney transplant. Patient presents as poor historian- sister Dianne Villasenor [phone],  Patient came alone today by transport. Procedure and date of procedure, confirmed with sister . As per [employer] cardiologist noted patient was scheduled for angiogram, sister states DOS was 4/16/21 but was cancelled and not yet rescheduled   ***  Appt for COVID PCR  confirmed by sister 56 yo male with history of HTN, CAD s/p MI, s/p PCI with stent placement on Plavix and ASA, s/p CVA x2 with residual right sided weakness, dysphagia and moderate memory loss, DM, GERD, with ESRD on HD (Via right AV fistula), being evaluated for a kidney transplant. Patient presents as poor historian- sister Dianne Villasenor [phone] used during PST visit,  Patient came alone today by transport. Procedure and date of procedure, confirmed with sister . As per [employer] cardiologist noted patient was scheduled for angiogram, sister states DOS was 4/16/21 but was cancelled and not yet rescheduled. US doppler noted 70% stenosis right ICA with pre-obstructive antegrade pattern flow through right vertebral artery   ***  Appt for COVID PCR  confirmed by sister

## 2021-04-19 NOTE — H&P PST ADULT - RS GEN PE MLT RESP DETAILS PC
airway patent/breath sounds equal/good air movement/respirations non-labored/clear to auscultation bilaterally/no rales/no rhonchi/no wheezes breath sounds equal/good air movement/respirations non-labored/no rales/no rhonchi/no wheezes

## 2021-04-19 NOTE — H&P PST ADULT - CARDIOVASCULAR SYMPTOMS
Cardiologist recommended angiogram., scheduled for 4/16/21 but sister states the procedure was canceled and has not f/u with cardiologist to reschedule/dyspnea on exertion

## 2021-04-19 NOTE — H&P PST ADULT - NS MD HP INPLANTS MED DEV
Coronary stent, Right AVF/Vascular stents/Clips Coronary stent, Right AV fistula/Vascular stents/Clips

## 2021-04-20 PROBLEM — E78.5 HYPERLIPIDEMIA, UNSPECIFIED: Chronic | Status: ACTIVE | Noted: 2021-04-19

## 2021-04-21 ENCOUNTER — OUTPATIENT (OUTPATIENT)
Dept: OUTPATIENT SERVICES | Facility: HOSPITAL | Age: 56
LOS: 1 days | End: 2021-04-21
Payer: COMMERCIAL

## 2021-04-21 DIAGNOSIS — Z11.52 ENCOUNTER FOR SCREENING FOR COVID-19: ICD-10-CM

## 2021-04-21 DIAGNOSIS — Z95.5 PRESENCE OF CORONARY ANGIOPLASTY IMPLANT AND GRAFT: Chronic | ICD-10-CM

## 2021-04-21 DIAGNOSIS — Z95.828 PRESENCE OF OTHER VASCULAR IMPLANTS AND GRAFTS: Chronic | ICD-10-CM

## 2021-04-21 DIAGNOSIS — Z98.890 OTHER SPECIFIED POSTPROCEDURAL STATES: Chronic | ICD-10-CM

## 2021-04-21 LAB — SARS-COV-2 RNA SPEC QL NAA+PROBE: SIGNIFICANT CHANGE UP

## 2021-04-21 PROCEDURE — C9803: CPT

## 2021-04-21 PROCEDURE — U0003: CPT

## 2021-04-21 PROCEDURE — U0005: CPT

## 2021-04-21 RX ORDER — TICAGRELOR 90 MG/1
90 TABLET ORAL
Qty: 90 | Refills: 0 | Status: ACTIVE | COMMUNITY
Start: 2021-04-21 | End: 1900-01-01

## 2021-04-22 ENCOUNTER — NON-APPOINTMENT (OUTPATIENT)
Age: 56
End: 2021-04-22

## 2021-04-23 ENCOUNTER — OUTPATIENT (OUTPATIENT)
Dept: OUTPATIENT SERVICES | Facility: HOSPITAL | Age: 56
LOS: 1 days | End: 2021-04-23
Payer: COMMERCIAL

## 2021-04-23 VITALS
HEART RATE: 77 BPM | SYSTOLIC BLOOD PRESSURE: 136 MMHG | OXYGEN SATURATION: 100 % | DIASTOLIC BLOOD PRESSURE: 74 MMHG | RESPIRATION RATE: 18 BRPM

## 2021-04-23 VITALS
WEIGHT: 164.02 LBS | DIASTOLIC BLOOD PRESSURE: 71 MMHG | HEIGHT: 67 IN | RESPIRATION RATE: 16 BRPM | OXYGEN SATURATION: 97 % | TEMPERATURE: 98 F | SYSTOLIC BLOOD PRESSURE: 128 MMHG | HEART RATE: 80 BPM

## 2021-04-23 DIAGNOSIS — I25.10 ATHEROSCLEROTIC HEART DISEASE OF NATIVE CORONARY ARTERY WITHOUT ANGINA PECTORIS: ICD-10-CM

## 2021-04-23 DIAGNOSIS — Z95.828 PRESENCE OF OTHER VASCULAR IMPLANTS AND GRAFTS: Chronic | ICD-10-CM

## 2021-04-23 DIAGNOSIS — Z95.5 PRESENCE OF CORONARY ANGIOPLASTY IMPLANT AND GRAFT: Chronic | ICD-10-CM

## 2021-04-23 DIAGNOSIS — Z98.890 OTHER SPECIFIED POSTPROCEDURAL STATES: Chronic | ICD-10-CM

## 2021-04-23 LAB
ALBUMIN SERPL ELPH-MCNC: 4.5 G/DL — SIGNIFICANT CHANGE UP (ref 3.3–5)
ALP SERPL-CCNC: 106 U/L — SIGNIFICANT CHANGE UP (ref 40–120)
ALT FLD-CCNC: 20 U/L — SIGNIFICANT CHANGE UP (ref 10–45)
ANION GAP SERPL CALC-SCNC: 21 MMOL/L — HIGH (ref 5–17)
AST SERPL-CCNC: 33 U/L — SIGNIFICANT CHANGE UP (ref 10–40)
BILIRUB SERPL-MCNC: 0.4 MG/DL — SIGNIFICANT CHANGE UP (ref 0.2–1.2)
BUN SERPL-MCNC: 29 MG/DL — HIGH (ref 7–23)
CALCIUM SERPL-MCNC: 9 MG/DL — SIGNIFICANT CHANGE UP (ref 8.4–10.5)
CHLORIDE SERPL-SCNC: 91 MMOL/L — LOW (ref 96–108)
CO2 SERPL-SCNC: 25 MMOL/L — SIGNIFICANT CHANGE UP (ref 22–31)
CREAT SERPL-MCNC: 5.35 MG/DL — HIGH (ref 0.5–1.3)
GLUCOSE BLDC GLUCOMTR-MCNC: 144 MG/DL — HIGH (ref 70–99)
GLUCOSE SERPL-MCNC: 143 MG/DL — HIGH (ref 70–99)
HCT VFR BLD CALC: 38.4 % — LOW (ref 39–50)
HGB BLD-MCNC: 12.8 G/DL — LOW (ref 13–17)
MCHC RBC-ENTMCNC: 29.5 PG — SIGNIFICANT CHANGE UP (ref 27–34)
MCHC RBC-ENTMCNC: 33.3 GM/DL — SIGNIFICANT CHANGE UP (ref 32–36)
MCV RBC AUTO: 88.5 FL — SIGNIFICANT CHANGE UP (ref 80–100)
NRBC # BLD: 0 /100 WBCS — SIGNIFICANT CHANGE UP (ref 0–0)
PLATELET # BLD AUTO: 344 K/UL — SIGNIFICANT CHANGE UP (ref 150–400)
POTASSIUM SERPL-MCNC: 4.2 MMOL/L — SIGNIFICANT CHANGE UP (ref 3.5–5.3)
POTASSIUM SERPL-SCNC: 4.2 MMOL/L — SIGNIFICANT CHANGE UP (ref 3.5–5.3)
PROT SERPL-MCNC: 8.2 G/DL — SIGNIFICANT CHANGE UP (ref 6–8.3)
RBC # BLD: 4.34 M/UL — SIGNIFICANT CHANGE UP (ref 4.2–5.8)
RBC # FLD: 11.9 % — SIGNIFICANT CHANGE UP (ref 10.3–14.5)
SODIUM SERPL-SCNC: 137 MMOL/L — SIGNIFICANT CHANGE UP (ref 135–145)
WBC # BLD: 12.46 K/UL — HIGH (ref 3.8–10.5)
WBC # FLD AUTO: 12.46 K/UL — HIGH (ref 3.8–10.5)

## 2021-04-23 PROCEDURE — 99152 MOD SED SAME PHYS/QHP 5/>YRS: CPT

## 2021-04-23 PROCEDURE — 85027 COMPLETE CBC AUTOMATED: CPT

## 2021-04-23 PROCEDURE — 93010 ELECTROCARDIOGRAM REPORT: CPT

## 2021-04-23 PROCEDURE — C1769: CPT

## 2021-04-23 PROCEDURE — 80053 COMPREHEN METABOLIC PANEL: CPT

## 2021-04-23 PROCEDURE — 93458 L HRT ARTERY/VENTRICLE ANGIO: CPT | Mod: 26

## 2021-04-23 PROCEDURE — 82962 GLUCOSE BLOOD TEST: CPT

## 2021-04-23 PROCEDURE — 93005 ELECTROCARDIOGRAM TRACING: CPT

## 2021-04-23 PROCEDURE — C1894: CPT

## 2021-04-23 PROCEDURE — C1887: CPT

## 2021-04-23 PROCEDURE — 93458 L HRT ARTERY/VENTRICLE ANGIO: CPT

## 2021-04-23 RX ORDER — TICAGRELOR 90 MG/1
1 TABLET ORAL
Qty: 0 | Refills: 0 | DISCHARGE

## 2021-04-23 RX ORDER — SODIUM CHLORIDE 9 MG/ML
3 INJECTION INTRAMUSCULAR; INTRAVENOUS; SUBCUTANEOUS EVERY 8 HOURS
Refills: 0 | Status: DISCONTINUED | OUTPATIENT
Start: 2021-04-23 | End: 2021-05-07

## 2021-04-23 RX ORDER — CLOPIDOGREL BISULFATE 75 MG/1
1 TABLET, FILM COATED ORAL
Qty: 0 | Refills: 0 | DISCHARGE

## 2021-04-23 NOTE — ASU DISCHARGE PLAN (ADULT/PEDIATRIC) - CARE PROVIDER_API CALL
Israel Miller)  Cardiology; Internal Medicine  [address] [facility], [facility] [address]  [place] [address]  Phone: [phone]  Fax: [phone]  Established Patient  Follow Up Time: 2 weeks

## 2021-04-23 NOTE — ASU DISCHARGE PLAN (ADULT/PEDIATRIC) - ASU DC SPECIAL INSTRUCTIONSFT
No lifting over 10 pounds for 1 week.  If redness, swelling or discharge occurs at Right Groin call your doctor, No driving for 24hours.  You may shower. Please see preprinted discharge instruction sheet. Call your cardiologist for an appointment within 2 weeks.

## 2021-04-24 ENCOUNTER — APPOINTMENT (OUTPATIENT)
Dept: DISASTER EMERGENCY | Facility: CLINIC | Age: 56
End: 2021-04-24

## 2021-04-25 LAB — SARS-COV-2 N GENE NPH QL NAA+PROBE: NOT DETECTED

## 2021-04-26 ENCOUNTER — TRANSCRIPTION ENCOUNTER (OUTPATIENT)
Age: 56
End: 2021-04-26

## 2021-04-26 NOTE — ASU PATIENT PROFILE, ADULT - PMH
CAD (coronary artery disease)  s/p coronary stents  Diabetes    End stage renal disease  HD on Tu-Th-Sat right AVF  H/O heartburn    Heart attack    HLD (hyperlipidemia)    Hypertension    Occlusion and stenosis of bilateral carotid arteries  US doppler noted 70% stenosis right ICA with pre-obstructive antegrade pattern flow through right vertebral artery  Stroke  right sided weaknes

## 2021-04-26 NOTE — ASU PATIENT PROFILE, ADULT - PSH
S/P arteriovenous (AV) fistula creation  2019 right AVF  S/P dialysis catheter insertion  Right chest since removed  Stented coronary artery  2011

## 2021-04-27 ENCOUNTER — APPOINTMENT (OUTPATIENT)
Dept: VASCULAR SURGERY | Facility: HOSPITAL | Age: 56
End: 2021-04-27

## 2021-04-27 ENCOUNTER — INPATIENT (INPATIENT)
Facility: HOSPITAL | Age: 56
LOS: 0 days | Discharge: HOME | End: 2021-04-28
Attending: SURGERY | Admitting: SURGERY
Payer: MEDICARE

## 2021-04-27 VITALS
DIASTOLIC BLOOD PRESSURE: 71 MMHG | SYSTOLIC BLOOD PRESSURE: 120 MMHG | OXYGEN SATURATION: 100 % | HEIGHT: 68 IN | WEIGHT: 160.06 LBS | HEART RATE: 83 BPM | TEMPERATURE: 98 F | RESPIRATION RATE: 16 BRPM

## 2021-04-27 DIAGNOSIS — Z95.5 PRESENCE OF CORONARY ANGIOPLASTY IMPLANT AND GRAFT: Chronic | ICD-10-CM

## 2021-04-27 DIAGNOSIS — Z95.828 PRESENCE OF OTHER VASCULAR IMPLANTS AND GRAFTS: Chronic | ICD-10-CM

## 2021-04-27 DIAGNOSIS — I65.23 OCCLUSION AND STENOSIS OF BILATERAL CAROTID ARTERIES: ICD-10-CM

## 2021-04-27 DIAGNOSIS — Z98.890 OTHER SPECIFIED POSTPROCEDURAL STATES: Chronic | ICD-10-CM

## 2021-04-27 PROCEDURE — 37215 TRANSCATH STENT CCA W/EPS: CPT | Mod: 80,RT

## 2021-04-27 PROCEDURE — 37215 TRANSCATH STENT CCA W/EPS: CPT | Mod: RT

## 2021-04-27 RX ORDER — SODIUM CHLORIDE 9 MG/ML
1000 INJECTION, SOLUTION INTRAVENOUS
Refills: 0 | Status: DISCONTINUED | OUTPATIENT
Start: 2021-04-27 | End: 2021-04-28

## 2021-04-27 RX ORDER — ATORVASTATIN CALCIUM 80 MG/1
40 TABLET, FILM COATED ORAL AT BEDTIME
Refills: 0 | Status: DISCONTINUED | OUTPATIENT
Start: 2021-04-27 | End: 2021-04-28

## 2021-04-27 RX ORDER — INSULIN LISPRO 100/ML
VIAL (ML) SUBCUTANEOUS
Refills: 0 | Status: DISCONTINUED | OUTPATIENT
Start: 2021-04-27 | End: 2021-04-27

## 2021-04-27 RX ORDER — ASPIRIN/CALCIUM CARB/MAGNESIUM 324 MG
81 TABLET ORAL DAILY
Refills: 0 | Status: DISCONTINUED | OUTPATIENT
Start: 2021-04-28 | End: 2021-04-28

## 2021-04-27 RX ORDER — DEXTROSE 50 % IN WATER 50 %
25 SYRINGE (ML) INTRAVENOUS ONCE
Refills: 0 | Status: DISCONTINUED | OUTPATIENT
Start: 2021-04-27 | End: 2021-04-27

## 2021-04-27 RX ORDER — HYDRALAZINE HCL 50 MG
100 TABLET ORAL THREE TIMES A DAY
Refills: 0 | Status: DISCONTINUED | OUTPATIENT
Start: 2021-04-27 | End: 2021-04-28

## 2021-04-27 RX ORDER — FENTANYL CITRATE 50 UG/ML
50 INJECTION INTRAVENOUS ONCE
Refills: 0 | Status: DISCONTINUED | OUTPATIENT
Start: 2021-04-27 | End: 2021-04-27

## 2021-04-27 RX ORDER — INSULIN LISPRO 100/ML
VIAL (ML) SUBCUTANEOUS AT BEDTIME
Refills: 0 | Status: DISCONTINUED | OUTPATIENT
Start: 2021-04-27 | End: 2021-04-28

## 2021-04-27 RX ORDER — DEXTROSE 50 % IN WATER 50 %
25 SYRINGE (ML) INTRAVENOUS ONCE
Refills: 0 | Status: DISCONTINUED | OUTPATIENT
Start: 2021-04-27 | End: 2021-04-28

## 2021-04-27 RX ORDER — SODIUM CHLORIDE 9 MG/ML
1000 INJECTION, SOLUTION INTRAVENOUS
Refills: 0 | Status: DISCONTINUED | OUTPATIENT
Start: 2021-04-27 | End: 2021-04-27

## 2021-04-27 RX ORDER — CARVEDILOL PHOSPHATE 80 MG/1
12.5 CAPSULE, EXTENDED RELEASE ORAL EVERY 12 HOURS
Refills: 0 | Status: DISCONTINUED | OUTPATIENT
Start: 2021-04-27 | End: 2021-04-28

## 2021-04-27 RX ORDER — DEXTROSE 50 % IN WATER 50 %
15 SYRINGE (ML) INTRAVENOUS ONCE
Refills: 0 | Status: DISCONTINUED | OUTPATIENT
Start: 2021-04-27 | End: 2021-04-27

## 2021-04-27 RX ORDER — DEXTROSE 50 % IN WATER 50 %
15 SYRINGE (ML) INTRAVENOUS ONCE
Refills: 0 | Status: DISCONTINUED | OUTPATIENT
Start: 2021-04-27 | End: 2021-04-28

## 2021-04-27 RX ORDER — INSULIN LISPRO 100/ML
VIAL (ML) SUBCUTANEOUS
Refills: 0 | Status: DISCONTINUED | OUTPATIENT
Start: 2021-04-27 | End: 2021-04-28

## 2021-04-27 RX ORDER — GLUCAGON INJECTION, SOLUTION 0.5 MG/.1ML
1 INJECTION, SOLUTION SUBCUTANEOUS ONCE
Refills: 0 | Status: DISCONTINUED | OUTPATIENT
Start: 2021-04-27 | End: 2021-04-27

## 2021-04-27 RX ORDER — DEXTROSE 50 % IN WATER 50 %
12.5 SYRINGE (ML) INTRAVENOUS ONCE
Refills: 0 | Status: DISCONTINUED | OUTPATIENT
Start: 2021-04-27 | End: 2021-04-28

## 2021-04-27 RX ORDER — HYDROMORPHONE HYDROCHLORIDE 2 MG/ML
0.5 INJECTION INTRAMUSCULAR; INTRAVENOUS; SUBCUTANEOUS
Refills: 0 | Status: DISCONTINUED | OUTPATIENT
Start: 2021-04-27 | End: 2021-04-27

## 2021-04-27 RX ORDER — NIFEDIPINE 30 MG
90 TABLET, EXTENDED RELEASE 24 HR ORAL DAILY
Refills: 0 | Status: DISCONTINUED | OUTPATIENT
Start: 2021-04-28 | End: 2021-04-28

## 2021-04-27 RX ORDER — ACETAMINOPHEN 500 MG
1000 TABLET ORAL ONCE
Refills: 0 | Status: COMPLETED | OUTPATIENT
Start: 2021-04-27 | End: 2021-04-27

## 2021-04-27 RX ORDER — TICAGRELOR 90 MG/1
90 TABLET ORAL
Refills: 0 | Status: DISCONTINUED | OUTPATIENT
Start: 2021-04-27 | End: 2021-04-28

## 2021-04-27 RX ORDER — FENTANYL CITRATE 50 UG/ML
25 INJECTION INTRAVENOUS
Refills: 0 | Status: DISCONTINUED | OUTPATIENT
Start: 2021-04-27 | End: 2021-04-27

## 2021-04-27 RX ORDER — FAMOTIDINE 10 MG/ML
20 INJECTION INTRAVENOUS DAILY
Refills: 0 | Status: DISCONTINUED | OUTPATIENT
Start: 2021-04-27 | End: 2021-04-28

## 2021-04-27 RX ORDER — DEXTROSE 50 % IN WATER 50 %
12.5 SYRINGE (ML) INTRAVENOUS ONCE
Refills: 0 | Status: DISCONTINUED | OUTPATIENT
Start: 2021-04-27 | End: 2021-04-27

## 2021-04-27 RX ORDER — ONDANSETRON 8 MG/1
4 TABLET, FILM COATED ORAL ONCE
Refills: 0 | Status: DISCONTINUED | OUTPATIENT
Start: 2021-04-27 | End: 2021-04-27

## 2021-04-27 RX ORDER — SODIUM CHLORIDE 9 MG/ML
1000 INJECTION INTRAMUSCULAR; INTRAVENOUS; SUBCUTANEOUS
Refills: 0 | Status: DISCONTINUED | OUTPATIENT
Start: 2021-04-27 | End: 2021-04-28

## 2021-04-27 RX ORDER — GLUCAGON INJECTION, SOLUTION 0.5 MG/.1ML
1 INJECTION, SOLUTION SUBCUTANEOUS ONCE
Refills: 0 | Status: DISCONTINUED | OUTPATIENT
Start: 2021-04-27 | End: 2021-04-28

## 2021-04-27 RX ADMIN — Medication 100 MILLIGRAM(S): at 21:45

## 2021-04-27 RX ADMIN — SODIUM CHLORIDE 75 MILLILITER(S): 9 INJECTION INTRAMUSCULAR; INTRAVENOUS; SUBCUTANEOUS at 11:58

## 2021-04-27 RX ADMIN — Medication 1000 MILLIGRAM(S): at 18:33

## 2021-04-27 RX ADMIN — HYDROMORPHONE HYDROCHLORIDE 0.5 MILLIGRAM(S): 2 INJECTION INTRAMUSCULAR; INTRAVENOUS; SUBCUTANEOUS at 11:55

## 2021-04-27 RX ADMIN — ATORVASTATIN CALCIUM 40 MILLIGRAM(S): 80 TABLET, FILM COATED ORAL at 21:45

## 2021-04-27 RX ADMIN — TICAGRELOR 90 MILLIGRAM(S): 90 TABLET ORAL at 21:45

## 2021-04-27 RX ADMIN — SODIUM CHLORIDE 3 MILLILITER(S): 9 INJECTION INTRAMUSCULAR; INTRAVENOUS; SUBCUTANEOUS at 21:37

## 2021-04-27 RX ADMIN — HYDROMORPHONE HYDROCHLORIDE 0.5 MILLIGRAM(S): 2 INJECTION INTRAMUSCULAR; INTRAVENOUS; SUBCUTANEOUS at 12:05

## 2021-04-27 RX ADMIN — Medication 400 MILLIGRAM(S): at 17:06

## 2021-04-27 RX ADMIN — SODIUM CHLORIDE 3 MILLILITER(S): 9 INJECTION INTRAMUSCULAR; INTRAVENOUS; SUBCUTANEOUS at 14:24

## 2021-04-27 NOTE — CHART NOTE - NSCHARTNOTEFT_GEN_A_CORE
GENERAL SURGERY POST-OP CHECK NOTE:    Procedure: R. TCAR    Subjective:  Pt seen and examined at bedside in PACU.   SBP 120s. Mentating well. c/o mild headache and ache at incision site. Neck soft. No difficulty breathing.     Vital Signs Last 24 Hrs  T(C): 36.8 (27 Apr 2021 16:00), Max: 36.8 (27 Apr 2021 16:00)  T(F): 98.2 (27 Apr 2021 16:00), Max: 98.2 (27 Apr 2021 16:00)  HR: 65 (27 Apr 2021 16:00) (60 - 83)  BP: 133/66 (27 Apr 2021 16:00) (112/71 - 133/66)  BP(mean): 83 (27 Apr 2021 16:00) (81 - 83)  RR: 16 (27 Apr 2021 16:00) (10 - 20)  SpO2: 100% (27 Apr 2021 16:00) (98% - 100%)    PHYSICAL EXAM:  Gen: NAD, well-developed  Neuro: AAOX3, PERRL, CNII-XII grossly intact; GAYTAN's equally bilaterally  Pulm: Respirations grossly unlabored.   LLE: Groin soft, non-tender. No ecchymosis. No evidence of underlying hematoma.     A/P:  56yMale s/p right TCAR (4/27/21 ) POD 0. Patient is hemodynamically stable recovering in PACU.     - patient to remain in pacu for until 17:30, if remains stable can be transferred to floor after evaluation by vascular surgery team  - continue standard carotid post operative management including asa / statin / SBP goal 120-160.  - ofirmev for mild headache / incisional tenderness.     Please contact Surgery C-Team (P: [phone]) with any questions.    VICKY Alejandre MD, PGY-III  Surgery, C-Team  Pager: [phone]  [facility]

## 2021-04-27 NOTE — ASU PREOP CHECKLIST - COMMENTS
Carvedilol Hydralazine and Nifedipine rthis am., Carvedilol Hydralazine and Nifedipine this am., ASA, Brilinta and Nifedipine this am.,

## 2021-04-28 ENCOUNTER — TRANSCRIPTION ENCOUNTER (OUTPATIENT)
Age: 56
End: 2021-04-28

## 2021-04-28 VITALS
SYSTOLIC BLOOD PRESSURE: 118 MMHG | HEART RATE: 70 BPM | DIASTOLIC BLOOD PRESSURE: 68 MMHG | TEMPERATURE: 99 F | OXYGEN SATURATION: 100 % | RESPIRATION RATE: 18 BRPM

## 2021-04-28 LAB
ANION GAP SERPL CALC-SCNC: 14 MMOL/L — SIGNIFICANT CHANGE UP (ref 7–14)
BUN SERPL-MCNC: 44 MG/DL — HIGH (ref 7–23)
CALCIUM SERPL-MCNC: 8.1 MG/DL — LOW (ref 8.4–10.5)
CHLORIDE SERPL-SCNC: 95 MMOL/L — LOW (ref 98–107)
CO2 SERPL-SCNC: 24 MMOL/L — SIGNIFICANT CHANGE UP (ref 22–31)
COVID-19 SPIKE DOMAIN AB INTERP: POSITIVE
COVID-19 SPIKE DOMAIN ANTIBODY RESULT: >250 U/ML — HIGH
CREAT SERPL-MCNC: 7.28 MG/DL — HIGH (ref 0.5–1.3)
GLUCOSE SERPL-MCNC: 117 MG/DL — HIGH (ref 70–99)
HCT VFR BLD CALC: 29.4 % — LOW (ref 39–50)
HGB BLD-MCNC: 9.7 G/DL — LOW (ref 13–17)
MCHC RBC-ENTMCNC: 29.5 PG — SIGNIFICANT CHANGE UP (ref 27–34)
MCHC RBC-ENTMCNC: 33 GM/DL — SIGNIFICANT CHANGE UP (ref 32–36)
MCV RBC AUTO: 89.4 FL — SIGNIFICANT CHANGE UP (ref 80–100)
NRBC # BLD: 0 /100 WBCS — SIGNIFICANT CHANGE UP
NRBC # FLD: 0 K/UL — SIGNIFICANT CHANGE UP
PLATELET # BLD AUTO: 265 K/UL — SIGNIFICANT CHANGE UP (ref 150–400)
POTASSIUM SERPL-MCNC: 3.5 MMOL/L — SIGNIFICANT CHANGE UP (ref 3.5–5.3)
POTASSIUM SERPL-SCNC: 3.5 MMOL/L — SIGNIFICANT CHANGE UP (ref 3.5–5.3)
RBC # BLD: 3.29 M/UL — LOW (ref 4.2–5.8)
RBC # FLD: 12.1 % — SIGNIFICANT CHANGE UP (ref 10.3–14.5)
SARS-COV-2 IGG+IGM SERPL QL IA: >250 U/ML — HIGH
SARS-COV-2 IGG+IGM SERPL QL IA: POSITIVE
SODIUM SERPL-SCNC: 133 MMOL/L — LOW (ref 135–145)
WBC # BLD: 11.29 K/UL — HIGH (ref 3.8–10.5)
WBC # FLD AUTO: 11.29 K/UL — HIGH (ref 3.8–10.5)

## 2021-04-28 RX ORDER — ACETAMINOPHEN 500 MG
975 TABLET ORAL ONCE
Refills: 0 | Status: COMPLETED | OUTPATIENT
Start: 2021-04-28 | End: 2021-04-28

## 2021-04-28 RX ADMIN — Medication 81 MILLIGRAM(S): at 11:13

## 2021-04-28 RX ADMIN — Medication 975 MILLIGRAM(S): at 06:19

## 2021-04-28 RX ADMIN — SODIUM CHLORIDE 3 MILLILITER(S): 9 INJECTION INTRAMUSCULAR; INTRAVENOUS; SUBCUTANEOUS at 13:22

## 2021-04-28 RX ADMIN — TICAGRELOR 90 MILLIGRAM(S): 90 TABLET ORAL at 05:21

## 2021-04-28 RX ADMIN — SODIUM CHLORIDE 3 MILLILITER(S): 9 INJECTION INTRAMUSCULAR; INTRAVENOUS; SUBCUTANEOUS at 05:16

## 2021-04-28 RX ADMIN — FAMOTIDINE 20 MILLIGRAM(S): 10 INJECTION INTRAVENOUS at 11:13

## 2021-04-28 RX ADMIN — Medication 90 MILLIGRAM(S): at 07:00

## 2021-04-28 RX ADMIN — CARVEDILOL PHOSPHATE 12.5 MILLIGRAM(S): 80 CAPSULE, EXTENDED RELEASE ORAL at 05:21

## 2021-04-28 RX ADMIN — Medication 975 MILLIGRAM(S): at 06:49

## 2021-04-28 RX ADMIN — Medication 100 MILLIGRAM(S): at 05:21

## 2021-04-28 NOTE — PROGRESS NOTE ADULT - SUBJECTIVE AND OBJECTIVE BOX
Interval Events:  - No acute events overnight    S: Patient seen and examined at bedside and doing well.  Denies fevers, chills, nausea, emesis, chest pain, SOB.    O: Vital Signs  T(C): 37.3 (04-27 @ 21:40), Max: 37.3 (04-27 @ 21:40)  HR: 75 (04-27 @ 21:40) (60 - 83)  BP: 138/72 (04-27 @ 21:40) (109/58 - 138/72)  RR: 17 (04-27 @ 21:40) (10 - 22)  SpO2: 99% (04-27 @ 21:40) (97% - 100%)  04-27-21 @ 07:01  -  04-28-21 @ 00:18  --------------------------------------------------------  IN:  Total IN: 0 mL    OUT:    Voided (mL): 0 mL  Total OUT: 0 mL    Total NET: 0 mL        PHYSICAL EXAM:  Gen: NAD, well-developed  Neuro: AAOX3, PERRL, CNII-XII grossly intact; GAYTAN's equally bilaterally  Pulm: Respirations grossly unlabored.   LLE: Groin soft, non-tender. No ecchymosis. No evidence of underlying hematoma.

## 2021-04-28 NOTE — DISCHARGE NOTE PROVIDER - NSDCCPCAREPLAN_GEN_ALL_CORE_FT
PRINCIPAL DISCHARGE DIAGNOSIS  Diagnosis: Carotid stenosis  Assessment and Plan of Treatment: WOUND CARE:  Please keep incisions clean and dry. Please do not Scrub or rub incisions. Do not use lotion or powder on incisions.   BATHING: Please do not submerge wound underwater. You may shower and/or sponge bathe.  ACTIVITY: No heavy lifting or straining. Otherwise, you may return to your usual level of physical activity. If you are taking narcotic pain medication (such as Percocet) DO NOT drive a car, operate machinery or make important decisions.  DIET: Return to your usual diet.  NOTIFY YOUR SURGEON IF: You have any bleeding that does not stop, any pus draining from your wound(s), any fever (over 100.4 F) or chills, persistent nausea/vomiting, persistent diarrhea, or if your pain is not controlled on your discharge pain medications.  FOLLOW-UP: Please follow up with your primary care physician in one week regarding your hospitalization. Please follow-up with your surgeon, within 7 days following discharge- please call to schedule an appointment.

## 2021-04-28 NOTE — DISCHARGE NOTE PROVIDER - CARE PROVIDER_API CALL
Julia Fernandes)  Surgery  [address] [facility], [address]  [place] [address]  Phone: [phone]  Fax: [phone]  Follow Up Time:

## 2021-04-28 NOTE — DISCHARGE NOTE NURSING/CASE MANAGEMENT/SOCIAL WORK - PATIENT PORTAL LINK FT
You can access the FollowMyHealth Patient Portal offered by [facility] by registering at the following website: [email or web address]. By joining [employer]’s FollowMyHealth portal, you will also be able to view your health information using other applications (apps) compatible with our system.

## 2021-04-28 NOTE — DISCHARGE NOTE PROVIDER - HOSPITAL COURSE
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 US doppler noted 70% stenosis right ICA with pre-obstructive antegrade pattern flow through right vertebral artery.    Patient was admitted to the vascular surgery service. He was taken to the operating room and found to have focal stenosis of right carotid artery, stent was placed. Pt tolerated procedure well, without complication. Pt remained hemodynamically stable in the PACU and transferred to the surgical floor. Diet was restarted and advanced as tolerated. Pain control was transitioned from IV to PO pain meds. Pt currently ambulating, voiding, tolerating a regular diet, with pain well controlled on PO pain meds. Patient is stable for discharge home to follow up as an outpatient, instructed to call to schedule appointment.

## 2021-04-28 NOTE — DISCHARGE NOTE PROVIDER - NSDCMRMEDTOKEN_GEN_ALL_CORE_FT
aspirin 81 mg oral tablet: 1 tab(s) orally once a day  calcium acetate 667 mg oral tablet: 1 tab(s) orally 3 times a day  carvedilol 12.5 mg oral tablet: 1 tab(s) orally 2 times a day  famotidine 40 mg oral tablet: 1 tab(s) orally once a day (at bedtime)  hydrALAZINE 100 mg oral tablet: 1 tab(s) orally 3 times a day  Januvia 25 mg oral tablet: 1 tab(s) orally once a day  NIFEdipine 90 mg oral tablet, extended release: 1 tab(s) orally once a day  RenaPlex oral tablet: 30 milligram(s) orally once a day  jessica-m: 90 milligram(s) orally once a day  Ventolin HFA 90 mcg/inh inhalation aerosol: 2 puff(s) inhaled every 6 hours   aspirin 81 mg oral tablet: 1 tab(s) orally once a day  atorvastatin 40 mg oral tablet: 1 tab(s) orally once a day  Brilinta (ticagrelor) 90 mg oral tablet: 1 tab(s) orally 2 times a day  calcium acetate 667 mg oral tablet: 1 tab(s) orally 3 times a day  carvedilol 12.5 mg oral tablet: 1 tab(s) orally 2 times a day  famotidine 40 mg oral tablet: 1 tab(s) orally once a day (at bedtime)  hydrALAZINE 100 mg oral tablet: 1 tab(s) orally 3 times a day  Januvia 25 mg oral tablet: 1 tab(s) orally once a day  NIFEdipine 90 mg oral tablet, extended release: 1 tab(s) orally once a day  RenaPlex oral tablet: 30 milligram(s) orally once a day  jessica-m: 90 milligram(s) orally once a day  Ventolin HFA 90 mcg/inh inhalation aerosol: 2 puff(s) inhaled every 6 hours

## 2021-04-28 NOTE — DISCHARGE NOTE PROVIDER - CARE PROVIDERS DIRECT ADDRESSES
[email or web address]

## 2021-04-28 NOTE — PROGRESS NOTE ADULT - ASSESSMENT
56yMale s/p right TCAR (4/27/21 ) POD 1. Patient is hemodynamically stable recovering on the floors     PLAN:  - continue standard carotid post operative management including asa / statin / SBP goal 120-160.  - ofirmev for mild headache / incisional tenderness  - Brillanta 90    Surgery C-Team   [id number]

## 2021-04-28 NOTE — DISCHARGE NOTE PROVIDER - NSDCFUADDAPPT_GEN_ALL_CORE_FT
Please follow-up with your surgeon, Dr. Fernandes within 7 days following discharge- please call to schedule an appointment.

## 2021-05-03 PROBLEM — I65.23 OCCLUSION AND STENOSIS OF BILATERAL CAROTID ARTERIES: Chronic | Status: ACTIVE | Noted: 2021-04-19

## 2021-06-01 ENCOUNTER — APPOINTMENT (OUTPATIENT)
Dept: VASCULAR SURGERY | Facility: CLINIC | Age: 56
End: 2021-06-01
Payer: MEDICARE

## 2021-06-01 VITALS
SYSTOLIC BLOOD PRESSURE: 157 MMHG | TEMPERATURE: 97.2 F | HEIGHT: 68 IN | HEART RATE: 75 BPM | WEIGHT: 175 LBS | BODY MASS INDEX: 26.52 KG/M2 | DIASTOLIC BLOOD PRESSURE: 90 MMHG

## 2021-06-01 PROCEDURE — 93880 EXTRACRANIAL BILAT STUDY: CPT

## 2021-06-01 PROCEDURE — 99024 POSTOP FOLLOW-UP VISIT: CPT

## 2021-06-01 NOTE — ASSESSMENT
[FreeTextEntry1] : In summary, Mr. Ashby presents s/p right TCAR. A carotid duplex was performed in the office today, which showed patent right carotid stent and less than 50% left carotid stenosis. He should continue Aspirin 81mg daily, as well as Lipitor and Brilinta (new rx was sent to his pharmacy).\par \par He will follow up with surveillance duplex in six months.

## 2021-06-01 NOTE — HISTORY OF PRESENT ILLNESS
[FreeTextEntry1] : I have just had the pleasure of seeing Mr. Osiris Ashby in follow up for severe right carotid artery stenosis. Mr. Ashby is now s/p TCAR, performed on 4/27/21. Since surgery, Mr. Ashby has been doing well. His neck incision has healed and he denies erythema, induration, fluctuance or drainage. He denies any new focal neurologic deficits including extremity weakness or paresthesia, facial droop or vision problems. He is taking his Aspirin and Brilinta as instructed. He is otherwise without complaints.\par  \par His medical history is significant for ESRD on HD via right arm fistula, CAD s/p PCI/stents, HTN, and HLD\par \par Medications: Lipitor, Auryxia, Coreg, Brilinta, Famotidine, Lasix, Hydralazine, Januvia, Nifedipine, Aspirin, Nebulizers\par \par Allergies: NKDA\par \par Social history: Non-smoker\par \par FH: NC\par

## 2021-06-01 NOTE — PHYSICAL EXAM
[de-identified] : On physical examination the patient is in no acute distress and neurologically intact. Right neck incision is well healed. The lungs are clear to auscultation and the heart has a regular rate and rhythm. Abdomen is benign. Bilateral femoral and pedal pulses are palpable. \par \par

## 2021-06-21 NOTE — HISTORY OF PRESENT ILLNESS
[FreeTextEntry1] : 55-year-old male being seen in cardiac evaluation as a candidate for renal transplant.  He has had hypertension and diabetes since his 20s and has been on hemodialysis since 2017.  He had a heart attack with urgent stenting followed by a CVA with a residual right hemiparesis and impaired cognition in 2015.  He has noted dyspnea on exertion ever since that time, but has not had any further cardiac testing and has not had any further ischemic events.  He tolerates dialysis without any difficulty.\par \par The stroke affected his cognition and he is unable to recall his current medical regimen.  His most recent lab work revealed an LDL cholesterol of 70 and a creatinine of 7.3 with a GFR of 8.  He makes minimal amounts of urine.

## 2021-06-21 NOTE — DISCUSSION/SUMMARY
[FreeTextEntry1] : In summary, Mr. Ashby is a 55-year-old male with a history of longstanding hypertension and diabetes, renal insufficiency, on dialysis, coronary disease, and cerebrovascular disease.  He complains of dyspnea on exertion which is longstanding and unchanged.  His exam shows mildly elevated blood pressure (reports normal BPs during dialysis) clear lungs, and a normal cardiac exam.  His EKG is within normal limits.\par \par He may be an acceptable candidate for transplant, but requires cardiac testing before for being cleared.  He will be scheduled for an echo and pharmacologic stress test.  Assuming these are unremarkable he is cleared to proceed.  He was also instructed to bring a list of his current medical regimen at the time of testing.\par \par 6/21/21 ADDENDUM patient has undergone cardiac catheterization with angiography.  He has completely occluded OM, D1, and RPL and 40 to 50% stenosis in his mid LAD and right coronary which are believed to not be flow limited.  A prior LAD stent is patent.  He is not having symptoms and his angiographic appearance is stable so he is cleared to proceed with being placed on renal transplant list.\par \par ADDENDUM.  4/26 after an abnormal perfusion stress test the patient underwent angiography on 4/23.  He was found to have no significant proximal stenosis and a prior stent was patent.  He had distal total occlusions and obtuse marginal 1, diagonal 1, and his PDA.  This is a stable situation which does not require any intervention.  He is cleared to proceed with renal transplant.  He is also cleared for carotid surgery tomorrow.

## 2021-06-21 NOTE — PHYSICAL EXAM
[General Appearance - Well Developed] : well developed [Normal Appearance] : normal appearance [Well Groomed] : well groomed [General Appearance - Well Nourished] : well nourished [No Deformities] : no deformities [General Appearance - In No Acute Distress] : no acute distress [Normal Conjunctiva] : the conjunctiva exhibited no abnormalities [Eyelids - No Xanthelasma] : the eyelids demonstrated no xanthelasmas [Normal Oral Mucosa] : normal oral mucosa [No Oral Pallor] : no oral pallor [No Oral Cyanosis] : no oral cyanosis [Normal Jugular Venous A Waves Present] : normal jugular venous A waves present [Normal Jugular Venous V Waves Present] : normal jugular venous V waves present [No Jugular Venous Pritchett A Waves] : no jugular venous pritchett A waves [Heart Rate And Rhythm] : heart rate and rhythm were normal [Heart Sounds] : normal S1 and S2 [Murmurs] : no murmurs present [Respiration, Rhythm And Depth] : normal respiratory rhythm and effort [Exaggerated Use Of Accessory Muscles For Inspiration] : no accessory muscle use [Auscultation Breath Sounds / Voice Sounds] : lungs were clear to auscultation bilaterally [Abdomen Soft] : soft [Abdomen Tenderness] : non-tender [Abdomen Mass (___ Cm)] : no abdominal mass palpated [Nail Clubbing] : no clubbing of the fingernails [Cyanosis, Localized] : no localized cyanosis [Petechial Hemorrhages (___cm)] : no petechial hemorrhages [Skin Color & Pigmentation] : normal skin color and pigmentation [] : no rash [No Venous Stasis] : no venous stasis [Skin Lesions] : no skin lesions [No Skin Ulcers] : no skin ulcer [No Xanthoma] : no  xanthoma was observed [Oriented To Time, Place, And Person] : oriented to person, place, and time [Affect] : the affect was normal [Mood] : the mood was normal [No Anxiety] : not feeling anxious [FreeTextEntry1] : Walks with a cane, right-sided weakness

## 2021-08-18 RX ORDER — POLYETHYLENE GLYCOL 3350 AND ELECTROLYTES WITH LEMON FLAVOR 236; 22.74; 6.74; 5.86; 2.97 G/4L; G/4L; G/4L; G/4L; G/4L
236 POWDER, FOR SOLUTION ORAL
Qty: 1 | Refills: 0 | Status: ACTIVE | COMMUNITY
Start: 2021-08-18 | End: 1900-01-01

## 2021-10-01 ENCOUNTER — APPOINTMENT (OUTPATIENT)
Dept: DISASTER EMERGENCY | Facility: CLINIC | Age: 56
End: 2021-10-01

## 2021-10-02 LAB — SARS-COV-2 N GENE NPH QL NAA+PROBE: NOT DETECTED

## 2021-10-18 ENCOUNTER — OUTPATIENT (OUTPATIENT)
Dept: OUTPATIENT SERVICES | Facility: HOSPITAL | Age: 56
LOS: 1 days | End: 2021-10-18
Payer: COMMERCIAL

## 2021-10-18 VITALS
SYSTOLIC BLOOD PRESSURE: 148 MMHG | OXYGEN SATURATION: 99 % | RESPIRATION RATE: 16 BRPM | WEIGHT: 154.1 LBS | HEIGHT: 68 IN | DIASTOLIC BLOOD PRESSURE: 86 MMHG | TEMPERATURE: 97 F | HEART RATE: 84 BPM

## 2021-10-18 DIAGNOSIS — G47.33 OBSTRUCTIVE SLEEP APNEA (ADULT) (PEDIATRIC): ICD-10-CM

## 2021-10-18 DIAGNOSIS — Z01.818 ENCOUNTER FOR OTHER PREPROCEDURAL EXAMINATION: ICD-10-CM

## 2021-10-18 DIAGNOSIS — Z95.828 PRESENCE OF OTHER VASCULAR IMPLANTS AND GRAFTS: Chronic | ICD-10-CM

## 2021-10-18 DIAGNOSIS — Z98.890 OTHER SPECIFIED POSTPROCEDURAL STATES: Chronic | ICD-10-CM

## 2021-10-18 DIAGNOSIS — Z95.5 PRESENCE OF CORONARY ANGIOPLASTY IMPLANT AND GRAFT: Chronic | ICD-10-CM

## 2021-10-18 DIAGNOSIS — Z12.11 ENCOUNTER FOR SCREENING FOR MALIGNANT NEOPLASM OF COLON: ICD-10-CM

## 2021-10-18 DIAGNOSIS — H26.9 UNSPECIFIED CATARACT: Chronic | ICD-10-CM

## 2021-10-18 DIAGNOSIS — N18.6 END STAGE RENAL DISEASE: ICD-10-CM

## 2021-10-18 DIAGNOSIS — Z98.890 OTHER SPECIFIED POSTPROCEDURAL STATES: ICD-10-CM

## 2021-10-18 LAB
A1C WITH ESTIMATED AVERAGE GLUCOSE RESULT: 6.2 % — HIGH (ref 4–5.6)
ANION GAP SERPL CALC-SCNC: 15 MMOL/L — SIGNIFICANT CHANGE UP (ref 5–17)
BUN SERPL-MCNC: 27 MG/DL — HIGH (ref 7–23)
CALCIUM SERPL-MCNC: 9.5 MG/DL — SIGNIFICANT CHANGE UP (ref 8.4–10.5)
CHLORIDE SERPL-SCNC: 98 MMOL/L — SIGNIFICANT CHANGE UP (ref 96–108)
CO2 SERPL-SCNC: 25 MMOL/L — SIGNIFICANT CHANGE UP (ref 22–31)
CREAT SERPL-MCNC: 6.54 MG/DL — HIGH (ref 0.5–1.3)
ESTIMATED AVERAGE GLUCOSE: 131 MG/DL — HIGH (ref 68–114)
GLUCOSE SERPL-MCNC: 114 MG/DL — HIGH (ref 70–99)
HCT VFR BLD CALC: 42.4 % — SIGNIFICANT CHANGE UP (ref 39–50)
HGB BLD-MCNC: 13.5 G/DL — SIGNIFICANT CHANGE UP (ref 13–17)
MCHC RBC-ENTMCNC: 29 PG — SIGNIFICANT CHANGE UP (ref 27–34)
MCHC RBC-ENTMCNC: 31.8 GM/DL — LOW (ref 32–36)
MCV RBC AUTO: 91 FL — SIGNIFICANT CHANGE UP (ref 80–100)
NRBC # BLD: 0 /100 WBCS — SIGNIFICANT CHANGE UP (ref 0–0)
PLATELET # BLD AUTO: 374 K/UL — SIGNIFICANT CHANGE UP (ref 150–400)
POTASSIUM SERPL-MCNC: 4.2 MMOL/L — SIGNIFICANT CHANGE UP (ref 3.5–5.3)
POTASSIUM SERPL-SCNC: 4.2 MMOL/L — SIGNIFICANT CHANGE UP (ref 3.5–5.3)
RBC # BLD: 4.66 M/UL — SIGNIFICANT CHANGE UP (ref 4.2–5.8)
RBC # FLD: 12.1 % — SIGNIFICANT CHANGE UP (ref 10.3–14.5)
SODIUM SERPL-SCNC: 138 MMOL/L — SIGNIFICANT CHANGE UP (ref 135–145)
WBC # BLD: 10.6 K/UL — HIGH (ref 3.8–10.5)
WBC # FLD AUTO: 10.6 K/UL — HIGH (ref 3.8–10.5)

## 2021-10-18 PROCEDURE — G0463: CPT

## 2021-10-18 PROCEDURE — 85027 COMPLETE CBC AUTOMATED: CPT

## 2021-10-18 PROCEDURE — 83036 HEMOGLOBIN GLYCOSYLATED A1C: CPT

## 2021-10-18 PROCEDURE — 80048 BASIC METABOLIC PNL TOTAL CA: CPT

## 2021-10-18 RX ORDER — FAMOTIDINE 10 MG/ML
1 INJECTION INTRAVENOUS
Qty: 0 | Refills: 0 | DISCHARGE

## 2021-10-18 RX ORDER — MULTIVIT-MIN/FERROUS GLUCONATE 9 MG/15 ML
30 LIQUID (ML) ORAL
Qty: 0 | Refills: 0 | DISCHARGE

## 2021-10-18 NOTE — H&P PST ADULT - PROBLEM SELECTOR PLAN 1
Pt. is scheduled for colonoscopy with anesthesia on 11/1/21.  Preop instructions reviewed, pt verbalized understanding.  Preop labs drawn (CBC, BMP, HGBA1C).  Pt. instructed to continue ASA for coronary stent protection prior to surgery. Pt. is scheduled for colonoscopy with anesthesia on 11/1/21.  Preop instructions reviewed, pt verbalized understanding.  Preop labs drawn (CBC, BMP, HGBA1C).  Pt. instructed to continue ASA for coronary stent protection prior to surgery.  Please check fingerstick stat prior to procedure.

## 2021-10-18 NOTE — H&P PST ADULT - NS MD HP INPLANTS MED DEV
Coronary stent, Right AV fistula/Vascular stents/Clips right carotid stent, Coronary stent, Right AV fistula/Lens implant/Vascular stents/Clips

## 2021-10-18 NOTE — H&P PST ADULT - HISTORY OF PRESENT ILLNESS
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 Patient presents as poor historian- sister Dianne Villasenor [phone] used during PST visit,  Patient came alone today by transport. Procedure and date of procedure, confirmed with sister . As per [employer] cardiologist noted patient was scheduled for angiogram, sister states DOS was 4/16/21 but was cancelled and not yet rescheduled. 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 Patient presents as poor historian- sister Dianne Villasenor [phone] used during PST visit,  Patient came alone today by transport. Procedure and date of procedure, confirmed with sister . As per [employer] cardiologist noted patient was scheduled for angiogram, sister states DOS was 4/16/21 but was cancelled and not yet rescheduled. US doppler noted 70% stenosis right ICA with pre-obstructive antegrade pattern flow through right vertebral artery     ***  Appt for COVID PCR  confirmed by sister 57 yo male with history of multiple medical problems presents today for presurgical evaluation.  PMHx includes HTN, CAD s/p MI, s/p PCI with stent placement (2017), CVA x2 (2017) with residual right sided weakness, dysphagia and memory loss, DM, GERD, with ESRD on HD (Via right AV fistula Tues/Thurs/Sat), is being evaluated for a kidney transplant. He  ........      Patient presents as poor historian- sister Dianne Villasenor [phone] used during PST visit,  Patient came alone today by transport. Procedure and date of procedure, confirmed with sister . As per [employer] cardiologist noted patient was scheduled for angiogram, sister states DOS was 4/16/21 but was cancelled and not yet rescheduled. US doppler noted 70% stenosis right ICA with pre-obstructive antegrade pattern flow through right vertebral artery     ***  Appt for COVID PCR  confirmed by sister 55 yo male with history of multiple medical problems presents today for presurgical evaluation.  PMHx includes HTN, CAD s/p MI, s/p PCI with stent placement (2017), CVA x2 (2017) with residual right sided weakness, dysphagia and memory loss, DM, GERD, with ESRD on HD (Via right AV fistula Tues/Thurs/Sat), is being evaluated for a kidney transplant and has never had a colonoscopy.  He is s/p right carotid revascularization/stent placement 4/27/21 and is on ASA and Brilinta.  Patient is a poor historian, his sister Dianne Villasenor was contacted at [phone] to provide additional history and medication list.  Patient came alone today by transport.  He is scheduled for colonoscopy with anesthesia on 11/1/21.      Pt. denies any recent infection, fever, chills, chest pain, cough and wheezing.    Preop Covid swab scheduled for 10/29/21 at [facility] testing site.

## 2021-10-18 NOTE — H&P PST ADULT - PROBLEM SELECTOR PLAN 2
Will contact vascular surgeon Dr. Fernandes re: pre-procedure instructions for Brilinta as pt has recent right carotid artery revascularization with stent 4/27/21. Pt. instructed to stop Brilinta 5 days prior to colonoscopy as per discussion with vascular surgeon Dr. Fernandes and Dr. Diego.    Pt's sister contacted with instructions, she verbalized understanding.  Brilinta to be restarted post-procedure when safe.

## 2021-10-18 NOTE — H&P PST ADULT - ACTIVITY
able to walk 3 flights of stairs several times a day able to walk few blocks, 1 flight of stairs, ADL's

## 2021-10-18 NOTE — H&P PST ADULT - NEUROLOGICAL SYMPTOMS
Laura Perkins   MRN: [id number]    Department:  [facility] Emergency Department   Date of Visit:  5/2/2018           Disclosure     Insurance plans vary and the physician(s) referred by the ER may not be covered by your plan.  Please contact you tell this physician (or your personal doctor if your instructions are to return to your personal doctor) about any new or lasting problems. The primary care or specialist physician will see patients referred from the [facility] Emergency Department.  Aubrey Hawkins numbness and tingling in bilateral feet/weakness/paresthesias/difficulty walking/hemiparesis/confusion

## 2021-10-18 NOTE — H&P PST ADULT - OTHER CARE PROVIDERS
Dr Israel Miller (cardio) [phone]    Dr Polischa [phone] (vascular) Dr Israel Miller (cardio) [phone]    Dr Polischa [phone] (vascular), [facility] [phone]

## 2021-10-18 NOTE — H&P PST ADULT - PROBLEM SELECTOR PLAN 3
Please check serum potassium level stat prior to procedure. Please check serum potassium level stat prior to procedure.  Check fingerstick prior to procedure.

## 2021-10-18 NOTE — H&P PST ADULT - HEALTH CARE MAINTENANCE
Moderna vaccine 1st dose obtained Covid-19 Vaccine completed- received 2nd Pfizer dose in April 2021

## 2021-10-18 NOTE — H&P PST ADULT - NEGATIVE RESPIRATORY AND THORAX SYMPTOMS
no wheezing/no dyspnea/no cough/no hemoptysis/no pleuritic chest pain no wheezing/no cough/no hemoptysis/no pleuritic chest pain

## 2021-10-18 NOTE — H&P PST ADULT - CARDIOVASCULAR SYMPTOMS
Cardiologist recommended angiogram., scheduled for 4/16/21 but sister states the procedure was canceled and has not f/u with cardiologist to reschedule/dyspnea on exertion dyspnea on exertion

## 2021-10-18 NOTE — H&P PST ADULT - CARDIOVASCULAR COMMENTS
bilateral "weakness" when doing activities, patient mostly home, patient states "feet weakness when walking up stairs or walking to kitchen"

## 2021-10-18 NOTE — H&P PST ADULT - NSICDXPASTSURGICALHX_GEN_ALL_CORE_FT
PAST SURGICAL HISTORY:  S/P arteriovenous (AV) fistula creation 2019 right AVF    S/P dialysis catheter insertion Right chest since removed    Stented coronary artery 2011     PAST SURGICAL HISTORY:  S/P arteriovenous (AV) fistula creation 2019 right AVF    S/P dialysis catheter insertion Right chest since removed    Stented coronary artery 2011, right 4/2021     PAST SURGICAL HISTORY:  Right cataract Sept 2021    S/P arteriovenous (AV) fistula creation 2019 right AVF    S/P carotid endarterectomy right April 2021    S/P dialysis catheter insertion Right chest- since removed    Stented coronary artery Right x2 2011,2017

## 2021-10-18 NOTE — H&P PST ADULT - NEGATIVE GENERAL GENITOURINARY SYMPTOMS
no hematuria/no flank pain L/no flank pain R/no bladder infections no hematuria/no flank pain L/no flank pain R/no bladder infections/no dysuria

## 2021-10-18 NOTE — H&P PST ADULT - NSICDXPASTMEDICALHX_GEN_ALL_CORE_FT
PAST MEDICAL HISTORY:  CAD (coronary artery disease) s/p coronary stents    Diabetes     End stage renal disease HD on Tu-Th-Sat right AVF    H/O heartburn     Heart attack     HLD (hyperlipidemia)     Hypertension     Occlusion and stenosis of bilateral carotid arteries     Stroke right sided weaknes     PAST MEDICAL HISTORY:  CAD (coronary artery disease) s/p coronary stents    Diabetes     End stage renal disease HD on Tu-Th-Sat right AVF    H/O heartburn     Heart attack     HLD (hyperlipidemia)     Hypertension     Occlusion and stenosis of bilateral carotid arteries US doppler noted 70% stenosis right ICA with pre-obstructive antegrade pattern flow through right vertebral artery    [facility]     PAST MEDICAL HISTORY:  CAD (coronary artery disease) s/p coronary stents (2017)    Diabetes     End stage renal disease HD x 5 years on Tu-Th-Sat right AVF    H/O heartburn     Heart attack     HLD (hyperlipidemia)     Hypertension     Occlusion and stenosis of bilateral carotid arteries US doppler noted 70% stenosis right ICA with pre-obstructive antegrade pattern flow through right vertebral artery    Stroke right sided weaknes 2017     PAST MEDICAL HISTORY:  CAD (coronary artery disease) s/p coronary stents (2017)    Diabetes (now diet controlled)    End stage renal disease HD x 5 years on Tu-Th-Sat right AVF    H/O heartburn     Heart attack     HLD (hyperlipidemia)     Hypertension     Occlusion and stenosis of bilateral carotid arteries US doppler noted 70% stenosis right ICA with pre-obstructive antegrade pattern flow through right vertebral artery    Stroke right sided weaknes 2017

## 2021-10-21 PROBLEM — N18.6 END STAGE RENAL DISEASE: Chronic | Status: ACTIVE | Noted: 2019-10-28

## 2021-10-21 PROBLEM — I63.9 CEREBRAL INFARCTION, UNSPECIFIED: Chronic | Status: ACTIVE | Noted: 2019-10-28

## 2021-10-21 PROBLEM — I25.10 ATHEROSCLEROTIC HEART DISEASE OF NATIVE CORONARY ARTERY WITHOUT ANGINA PECTORIS: Chronic | Status: ACTIVE | Noted: 2021-04-19

## 2021-10-21 PROBLEM — E11.9 TYPE 2 DIABETES MELLITUS WITHOUT COMPLICATIONS: Chronic | Status: ACTIVE | Noted: 2019-10-28

## 2021-10-28 DIAGNOSIS — Z01.818 ENCOUNTER FOR OTHER PREPROCEDURAL EXAMINATION: ICD-10-CM

## 2021-10-29 ENCOUNTER — APPOINTMENT (OUTPATIENT)
Dept: DISASTER EMERGENCY | Facility: CLINIC | Age: 56
End: 2021-10-29

## 2021-10-30 LAB — SARS-COV-2 N GENE NPH QL NAA+PROBE: NOT DETECTED

## 2021-11-01 ENCOUNTER — APPOINTMENT (OUTPATIENT)
Dept: HEPATOLOGY | Facility: HOSPITAL | Age: 56
End: 2021-11-01

## 2021-11-01 ENCOUNTER — OUTPATIENT (OUTPATIENT)
Dept: OUTPATIENT SERVICES | Facility: HOSPITAL | Age: 56
LOS: 1 days | End: 2021-11-01
Payer: COMMERCIAL

## 2021-11-01 VITALS
HEIGHT: 68 IN | OXYGEN SATURATION: 100 % | TEMPERATURE: 97 F | DIASTOLIC BLOOD PRESSURE: 99 MMHG | SYSTOLIC BLOOD PRESSURE: 155 MMHG | HEART RATE: 81 BPM | WEIGHT: 160.06 LBS

## 2021-11-01 VITALS
OXYGEN SATURATION: 97 % | SYSTOLIC BLOOD PRESSURE: 148 MMHG | HEART RATE: 83 BPM | RESPIRATION RATE: 16 BRPM | DIASTOLIC BLOOD PRESSURE: 74 MMHG

## 2021-11-01 DIAGNOSIS — Z95.828 PRESENCE OF OTHER VASCULAR IMPLANTS AND GRAFTS: Chronic | ICD-10-CM

## 2021-11-01 DIAGNOSIS — Z01.818 ENCOUNTER FOR OTHER PREPROCEDURAL EXAMINATION: ICD-10-CM

## 2021-11-01 DIAGNOSIS — Z95.5 PRESENCE OF CORONARY ANGIOPLASTY IMPLANT AND GRAFT: Chronic | ICD-10-CM

## 2021-11-01 DIAGNOSIS — Z98.890 OTHER SPECIFIED POSTPROCEDURAL STATES: Chronic | ICD-10-CM

## 2021-11-01 DIAGNOSIS — H26.9 UNSPECIFIED CATARACT: Chronic | ICD-10-CM

## 2021-11-01 LAB — GLUCOSE BLDC GLUCOMTR-MCNC: 121 MG/DL — HIGH (ref 70–99)

## 2021-11-01 PROCEDURE — G0121: CPT

## 2021-11-01 PROCEDURE — G0121 COLON CA SCRN NOT HI RSK IND: CPT

## 2021-11-01 PROCEDURE — 82962 GLUCOSE BLOOD TEST: CPT

## 2021-11-01 RX ORDER — NIFEDIPINE 30 MG
1 TABLET, EXTENDED RELEASE 24 HR ORAL
Qty: 0 | Refills: 0 | DISCHARGE

## 2021-11-01 RX ORDER — CALCIUM ACETATE 667 MG
1 TABLET ORAL
Qty: 0 | Refills: 0 | DISCHARGE

## 2021-11-01 RX ORDER — DONEPEZIL HYDROCHLORIDE 10 MG/1
1 TABLET, FILM COATED ORAL
Qty: 0 | Refills: 0 | DISCHARGE

## 2021-11-01 RX ORDER — TICAGRELOR 90 MG/1
1 TABLET ORAL
Qty: 0 | Refills: 0 | DISCHARGE

## 2021-11-01 RX ORDER — ALBUTEROL 90 UG/1
2 AEROSOL, METERED ORAL
Qty: 0 | Refills: 0 | DISCHARGE

## 2021-11-01 RX ORDER — CARVEDILOL PHOSPHATE 80 MG/1
1 CAPSULE, EXTENDED RELEASE ORAL
Qty: 0 | Refills: 0 | DISCHARGE

## 2021-11-01 RX ORDER — FERRIC CITRATE 210 MG/1
2 TABLET, COATED ORAL
Qty: 0 | Refills: 0 | DISCHARGE

## 2021-11-01 RX ORDER — METHIMAZOLE 10 MG/1
1 TABLET ORAL
Qty: 0 | Refills: 0 | DISCHARGE

## 2021-11-01 RX ORDER — ATORVASTATIN CALCIUM 80 MG/1
1 TABLET, FILM COATED ORAL
Qty: 0 | Refills: 0 | DISCHARGE

## 2021-11-01 RX ORDER — HYDRALAZINE HCL 50 MG
1 TABLET ORAL
Qty: 0 | Refills: 0 | DISCHARGE

## 2021-11-01 RX ORDER — ASPIRIN/CALCIUM CARB/MAGNESIUM 324 MG
1 TABLET ORAL
Qty: 0 | Refills: 0 | DISCHARGE

## 2021-11-01 RX ORDER — SODIUM CHLORIDE 9 MG/ML
500 INJECTION INTRAMUSCULAR; INTRAVENOUS; SUBCUTANEOUS
Refills: 0 | Status: COMPLETED | OUTPATIENT
Start: 2021-11-01 | End: 2021-11-01

## 2021-11-01 RX ORDER — SITAGLIPTIN 50 MG/1
1 TABLET, FILM COATED ORAL
Qty: 0 | Refills: 0 | DISCHARGE

## 2021-11-01 RX ADMIN — SODIUM CHLORIDE 75 MILLILITER(S): 9 INJECTION INTRAMUSCULAR; INTRAVENOUS; SUBCUTANEOUS at 14:08

## 2021-11-01 NOTE — ASU PATIENT PROFILE, ADULT - NSICDXPASTMEDICALHX_GEN_ALL_CORE_FT
PAST MEDICAL HISTORY:  CAD (coronary artery disease) s/p coronary stents (2017)    Diabetes (now diet controlled)    End stage renal disease HD x 5 years on Tu-Th-Sat right AVF    H/O heartburn     Heart attack     HLD (hyperlipidemia)     Hypertension     Occlusion and stenosis of bilateral carotid arteries US doppler noted 70% stenosis right ICA with pre-obstructive antegrade pattern flow through right vertebral artery    Stroke right sided weaknes 2017

## 2021-11-01 NOTE — PRE PROCEDURE NOTE - PRE PROCEDURE EVALUATION
Attending Physician: Jose Luis Ospina                        Procedure: Colonoscopy    Indication for Procedure: Screening colonoscopy  ________________________________________________________  PAST MEDICAL & SURGICAL HISTORY:  Hypertension    Stroke  right sided weaknes 2017    Heart attack    Diabetes  (now diet controlled)    H/O heartburn    End stage renal disease  HD x 5 years on Tu-Th-Sat right AVF    HLD (hyperlipidemia)    CAD (coronary artery disease)  s/p coronary stents (2017)    Occlusion and stenosis of bilateral carotid arteries  US doppler noted 70% stenosis right ICA with pre-obstructive antegrade pattern flow through right vertebral artery    Stented coronary artery  Right x2 2011,2017    S/P dialysis catheter insertion  Right chest- since removed    S/P arteriovenous (AV) fistula creation  2019 right AVF    Right cataract  Sept 2021    S/P carotid endarterectomy  right April 2021      ALLERGIES:  No Known Allergies    HOME MEDICATIONS:  aspirin 81 mg oral tablet: 1 tab(s) orally once a day  atorvastatin 40 mg oral tablet: 1 tab(s) orally once a day  Auryxia 210 mg oral tablet: 2  orally 2 times a day  Brilinta (ticagrelor) 90 mg oral tablet: 1 tab(s) orally 2 times a day  calcium acetate 667 mg oral tablet: 1 tab(s) orally 3 times a day  carvedilol 12.5 mg oral tablet: 1 tab(s) orally 2 times a day  donepezil 5 mg oral tablet: 1 tab(s) orally once a day (at bedtime)  hydrALAZINE 100 mg oral tablet: 1 tab(s) orally 3 times a day  Januvia 25 mg oral tablet: 1 tab(s) orally once a day  methIMAzole 5 mg oral tablet: 1 tab(s) orally 2 times a day  NIFEdipine 90 mg oral tablet, extended release: 1 tab(s) orally once a day  Renal Vitamin oral tablet: 1 tab(s) orally once a day  jessica-m: 90 milligram(s) orally once a day  Ventolin HFA 90 mcg/inh inhalation aerosol: 2 puff(s) inhaled every 6 hours    AICD/PPM: [ ] yes   [ ] no    PERTINENT LAB DATA:                      PHYSICAL EXAMINATION:    Height (cm): 172.7  Weight (kg): 72.6  BMI (kg/m2): 24.3  BSA (m2): 1.86T(C): 36.2  HR: 81  BP: 155/99  RR: --  SpO2: 100%    Constitutional: NAD  HEENT: PERRLA, EOMI,    Neck:  No JVD  Respiratory: CTAB/L  Cardiovascular: S1 and S2  Gastrointestinal: BS+, soft, NT/ND  Extremities: No peripheral edema  Neurological: A/O x 3, no focal deficits  Psychiatric: Normal mood, normal affect  Skin: No rashes    ASA Class: I [ ]  II [ ]  III [ x]  IV [ ]    COMMENTS:    The patient is a suitable candidate for the planned procedure unless box checked [ ]  No, explain:

## 2021-11-01 NOTE — ASU PATIENT PROFILE, ADULT - NSICDXPASTSURGICALHX_GEN_ALL_CORE_FT
PAST SURGICAL HISTORY:  Right cataract Sept 2021    S/P arteriovenous (AV) fistula creation 2019 right AVF    S/P carotid endarterectomy right April 2021    S/P dialysis catheter insertion Right chest- since removed    Stented coronary artery Right x2 2011,2017

## 2021-11-01 NOTE — PRE-ANESTHESIA EVALUATION ADULT - NSANTHPMHFT_GEN_ALL_CORE
56M PMH: HTN, CAD-MI ( s/p stent 2011,2017, ) CVA ( residual R-weak, memory issues, dysphagia, ESRD (tues, thurs, sat) ,DM , GERD

## 2021-12-06 ENCOUNTER — APPOINTMENT (OUTPATIENT)
Dept: VASCULAR SURGERY | Facility: CLINIC | Age: 56
End: 2021-12-06
Payer: MEDICARE

## 2021-12-06 PROCEDURE — 99213 OFFICE O/P EST LOW 20 MIN: CPT

## 2021-12-06 PROCEDURE — 93880 EXTRACRANIAL BILAT STUDY: CPT

## 2021-12-06 NOTE — HISTORY OF PRESENT ILLNESS
[FreeTextEntry1] : I have just had the pleasure of seeing Mr. Osiris Ashby in follow up for severe right carotid artery stenosis. Mr. Ashby is now s/p TCAR, performed on 4/27/21. Since surgery, Mr. Ashby has been doing well. His neck incision has healed and he denies erythema, induration, fluctuance or drainage. He denies any new focal neurologic deficits including extremity weakness or paresthesia, facial droop or vision problems. He reports that he has trouble swallowing and is unable to eat. This has been the case since his initial stroke. He has not followed up with neurology or had any follow up for speech/swallowing. He is taking his Aspirin and Lipitor, but has not re-filled his Brilinta since August (completed 4 month course of the medication). He is otherwise without complaints.\par  \par His medical history is significant for ESRD on HD via right arm fistula, CAD s/p PCI/stents, HTN, and HLD\par \par Medications: Lipitor, Auryxia, Coreg, Famotidine, Lasix, Hydralazine, Januvia, Nifedipine, Aspirin, Nebulizers\par \par Allergies: NKDA\par \par Social history: Non-smoker\par \par FH: NC\par

## 2021-12-06 NOTE — ASSESSMENT
[FreeTextEntry1] : In summary, Mr. Ashby presents s/p right TCAR. A carotid duplex was performed in the office today and showed patent stent without flow-limiting lesions. The left ICA has <50% stenosis and he has antegrade flow in the vertebral arteries with known high-resistance pattern in the right vertebral artery.\par -He should continue Aspirin and Lipitor. At this point he has not taken Brliinta in four months and completed a four month course.\par -I will refer him to Neurology (Dr. Aponte) for speech/swallow followup/management.\par -He will follow up with duplex in six months.

## 2021-12-06 NOTE — PHYSICAL EXAM
[de-identified] : On physical examination the patient is in no acute distress and neurologically intact. Right neck incision is well healed. The lungs are clear to auscultation and the heart has a regular rate and rhythm. Abdomen is benign. Bilateral femoral and pedal pulses are palpable. \par \par

## 2022-01-20 NOTE — H&P PST ADULT - NSICDXPROBLEM_GEN_ALL_CORE_FT
Size Of Lesion In Cm (Optional): 0
Detail Level: Detailed
PROBLEM DIAGNOSES  Problem: Pre-op evaluation  Assessment and Plan: Labs - CBC, BMP, and A1c. COVID PCR 4/4  Pre op Hibiclens instructions reviewed and given to patient and sister over the phone. Surgical Coord to F/U with sister reagrding instructions regarding bowel prep.  Take routine am meds DOS with sip of water. Hold Plavix for one week. Last 3/31. Stay on ASA. Instructed to hold Januvia DOS. Instructed to hold and/or avoid other NSAIDs and OTC supplements. Tylenol is ok. Verbalized understanding

## 2022-02-16 ENCOUNTER — APPOINTMENT (OUTPATIENT)
Dept: GASTROENTEROLOGY | Facility: CLINIC | Age: 57
End: 2022-02-16
Payer: MEDICARE

## 2022-02-16 VITALS — BODY MASS INDEX: 24.1 KG/M2 | WEIGHT: 159 LBS | HEIGHT: 68 IN

## 2022-02-16 DIAGNOSIS — Z86.79 PERSONAL HISTORY OF OTHER DISEASES OF THE CIRCULATORY SYSTEM: ICD-10-CM

## 2022-02-16 DIAGNOSIS — Z78.9 OTHER SPECIFIED HEALTH STATUS: ICD-10-CM

## 2022-02-16 DIAGNOSIS — N18.9 CHRONIC KIDNEY DISEASE, UNSPECIFIED: ICD-10-CM

## 2022-02-16 DIAGNOSIS — I25.10 ATHEROSCLEROTIC HEART DISEASE OF NATIVE CORONARY ARTERY W/OUT ANGINA PECTORIS: ICD-10-CM

## 2022-02-16 DIAGNOSIS — Z12.11 ENCOUNTER FOR SCREENING FOR MALIGNANT NEOPLASM OF COLON: ICD-10-CM

## 2022-02-16 DIAGNOSIS — Z63.5 DISRUPTION OF FAMILY BY SEPARATION AND DIVORCE: ICD-10-CM

## 2022-02-16 PROCEDURE — 99204 OFFICE O/P NEW MOD 45 MIN: CPT

## 2022-02-16 PROCEDURE — 99214 OFFICE O/P EST MOD 30 MIN: CPT

## 2022-02-16 RX ORDER — POLYETHYLENE GLYCOL-3350 AND ELECTROLYTES WITH FLAVOR PACK 240; 5.84; 2.98; 6.72; 22.72 G/278.26G; G/278.26G; G/278.26G; G/278.26G; G/278.26G
240 POWDER, FOR SOLUTION ORAL
Qty: 1 | Refills: 0 | Status: ACTIVE | COMMUNITY
Start: 2022-02-16 | End: 1900-01-01

## 2022-02-16 SDOH — SOCIAL STABILITY - SOCIAL INSECURITY: DISRUPTION OF FAMILY BY SEPARATION AND DIVORCE: Z63.5

## 2022-02-16 NOTE — ASSESSMENT
[FreeTextEntry1] : MAMI WOODWARD was advised to undergo colonoscopy to which he agreed. The procedure will be performed in [facility] with the assistance of an anesthesiologist. The patient was given a GOLY preparation prescription and understood the \par procedure as it was explained to his. He was given a booklet distributed by the [employer] Society of Gastrointestinal\par  Endoscopy explaining the procedure in detail and he understood the risks of the procedure not limited to infection, bleeding,\par perforation or non- diagnosis of colorectal cancer. He was advised that he could not drive home, if he chooses to\par  receive sedation.\par \par Further diagnostic and treatment recommendations will be based upon the procedure and any biopsies, if they are taken.\par \par Thank you for allowing me to participate in this [facility] health care.\par \par , Best personal regards -- Don\par \par He will stop Brilinta 2 days before his colonoscopy\par

## 2022-02-16 NOTE — HISTORY OF PRESENT ILLNESS
[FreeTextEntry1] : He is a 56-year-old asymptomatic male referred for a pre kidney transplant colon evaluation.  He has never had a colonoscopy before so he is also referred for a screening colonoscopy as he is over the age of 44.  He denies a family history of colon cancer.  He had an attempted at a  colonoscopy last November which was unsuccessful as he was poorly prepared.

## 2022-02-18 NOTE — PRE-ANESTHESIA EVALUATION ADULT - NSANTHOSAYNRD_GEN_A_CORE
Orthopedics Orthopedics Pulmonology Hospitalist No. [facility] screening performed.  STOP BANG Legend: 0-2 = LOW Risk; 3-4 = INTERMEDIATE Risk; 5-8 = HIGH Risk

## 2022-03-09 ENCOUNTER — RESULT REVIEW (OUTPATIENT)
Age: 57
End: 2022-03-09

## 2022-03-09 ENCOUNTER — APPOINTMENT (OUTPATIENT)
Dept: GASTROENTEROLOGY | Facility: AMBULATORY SURGERY CENTER | Age: 57
End: 2022-03-09
Payer: MEDICARE

## 2022-03-09 DIAGNOSIS — K63.5 POLYP OF COLON: ICD-10-CM

## 2022-03-09 PROCEDURE — 45380 COLONOSCOPY AND BIOPSY: CPT | Mod: 59

## 2022-03-09 PROCEDURE — 45385 COLONOSCOPY W/LESION REMOVAL: CPT

## 2022-03-28 ENCOUNTER — NON-APPOINTMENT (OUTPATIENT)
Age: 57
End: 2022-03-28

## 2022-03-28 ENCOUNTER — APPOINTMENT (OUTPATIENT)
Dept: CARDIOLOGY | Facility: CLINIC | Age: 57
End: 2022-03-28
Payer: COMMERCIAL

## 2022-03-28 VITALS
WEIGHT: 158 LBS | BODY MASS INDEX: 23.95 KG/M2 | OXYGEN SATURATION: 100 % | HEIGHT: 68 IN | DIASTOLIC BLOOD PRESSURE: 84 MMHG | HEART RATE: 78 BPM | SYSTOLIC BLOOD PRESSURE: 150 MMHG

## 2022-03-28 PROCEDURE — 99072 ADDL SUPL MATRL&STAF TM PHE: CPT

## 2022-03-28 PROCEDURE — 99214 OFFICE O/P EST MOD 30 MIN: CPT

## 2022-03-28 PROCEDURE — 93000 ELECTROCARDIOGRAM COMPLETE: CPT | Mod: NC

## 2022-03-28 RX ORDER — DONEPEZIL HYDROCHLORIDE 5 MG/1
5 TABLET ORAL
Qty: 90 | Refills: 0 | Status: ACTIVE | COMMUNITY
Start: 2022-03-28

## 2022-03-28 RX ORDER — CARVEDILOL 12.5 MG/1
12.5 TABLET, FILM COATED ORAL
Qty: 180 | Refills: 3 | Status: ACTIVE | COMMUNITY
Start: 2022-03-28

## 2022-03-28 RX ORDER — METHIMAZOLE 5 MG/1
5 TABLET ORAL
Refills: 0 | Status: ACTIVE | COMMUNITY
Start: 2022-03-28

## 2022-03-28 RX ORDER — FUROSEMIDE 40 MG/1
40 TABLET ORAL DAILY
Qty: 90 | Refills: 1 | Status: ACTIVE | COMMUNITY
Start: 2022-03-28

## 2022-03-28 RX ORDER — ATORVASTATIN CALCIUM 40 MG/1
40 TABLET, FILM COATED ORAL
Qty: 90 | Refills: 3 | Status: ACTIVE | COMMUNITY
Start: 2022-03-28

## 2022-03-28 RX ORDER — NIFEDIPINE 90 MG/1
90 TABLET, EXTENDED RELEASE ORAL DAILY
Qty: 90 | Refills: 2 | Status: ACTIVE | COMMUNITY
Start: 2022-03-28

## 2022-03-28 RX ORDER — FAMOTIDINE 40 MG/1
40 TABLET, FILM COATED ORAL
Qty: 90 | Refills: 0 | Status: ACTIVE | COMMUNITY
Start: 2022-03-28

## 2022-03-28 RX ORDER — HYDRALAZINE HYDROCHLORIDE 100 MG/1
100 TABLET ORAL 3 TIMES DAILY
Qty: 270 | Refills: 2 | Status: ACTIVE | COMMUNITY
Start: 2022-03-28

## 2022-03-28 NOTE — HISTORY OF PRESENT ILLNESS
[FreeTextEntry1] : Mr. Newell is a 56-year-old man undergoing evaluation as a candidate for renal transplant.  He has had hypertension and diabetes since age 20s and has been on hemodialysis since 2017.  He had a myocardial infarction with urgent coronary stent and subsequent CVA with residual right hemiparesis and impaired cognition in 2015. He has noted dyspnea on exertion ever since that time. In April 2021 he had right transcarotid revascularization with placement of right carotid stent for asymptomatic severe disease. He tolerates dialysis without without adverse symptoms.

## 2022-03-28 NOTE — DISCUSSION/SUMMARY
[Responding to Treatment] : responding to treatment [Coronary Artery Disease] : coronary artery disease [Adenosine Stress Test] : adenosine stress test [Patient] : the patient [None] : There are no changes in medication management [de-identified] : maintained on ticagrelor and without aspirin [de-identified] : Carotid stenosis now has had transcatheter right carotid stent [de-identified] : remains on ticagrelor and not on aspirin [FreeTextEntry1] : Continued consideration for renal transplant and will repeat non-invasive cardiac testing now a year later.

## 2022-03-28 NOTE — PHYSICAL EXAM
[Not Palpable] : not palpable [Normal Rate] : normal [Rhythm Regular] : regular [Normal S1] : normal S1 [Normal S2] : normal S2 [No Murmur] : no murmurs heard [No Pitting Edema] : no pitting edema present [2+] : left 2+ [1+] : left 1+ [No Abnormalities] : the abdominal aorta was not enlarged and no bruit was heard [Normal] : alert and oriented, normal memory [Right Carotid Bruit] : no bruit heard over the right carotid [Left Carotid Bruit] : no bruit heard over the left carotid [de-identified] : walks with cane [de-identified] : right hemiparesis

## 2022-03-28 NOTE — CARDIOLOGY SUMMARY
[de-identified] : 3/28/2022 sinus rhythm, normal trace. [de-identified] : 4/23/2021 left main minor luminal irregularity, proximal LAD patent stent, mid LAD 40% stenosis, 1st diagonal 100% , Circumflex small OM1 with 100% , RCA with 50% proximal lesion and right posterolateral 100% .

## 2022-04-13 ENCOUNTER — APPOINTMENT (OUTPATIENT)
Dept: NEPHROLOGY | Facility: CLINIC | Age: 57
End: 2022-04-13
Payer: COMMERCIAL

## 2022-04-13 VITALS
SYSTOLIC BLOOD PRESSURE: 145 MMHG | WEIGHT: 160 LBS | TEMPERATURE: 97.9 F | BODY MASS INDEX: 24.25 KG/M2 | OXYGEN SATURATION: 99 % | HEART RATE: 77 BPM | HEIGHT: 68 IN | RESPIRATION RATE: 15 BRPM | DIASTOLIC BLOOD PRESSURE: 81 MMHG

## 2022-04-13 PROCEDURE — 99072 ADDL SUPL MATRL&STAF TM PHE: CPT

## 2022-04-13 PROCEDURE — 99215 OFFICE O/P EST HI 40 MIN: CPT

## 2022-04-13 NOTE — PLAN
[FreeTextEntry1] : 1. ESRD on dialysis since 2017-  Mr. MAMI WOODWARD  Is a high risk candidate for kidney transplantation and would benefit from transplantation . CAD, PVD, CVAX2, DM . no potential living donors. \par 2.Cardiac risk: h/o MI s/p PCI in 2011 . Cardiac cath on 4/23/2021 left main minor luminal irregularity, proximal LAD patent stent, mid LAD 40% stenosis, 1st diagonal 100% , Circumflex small OM1 with 100% , RCA with 50% proximal lesion and right posterolateral 100% . \par 3. Cancer screening:  colonoscopy done in Feb 2022, wnl.  PSA wnl\par 4. ID: update serologies for acute and chronic viral infections. \par 5. Imaging: repeat CT chest\par \par \par \par \par I have personally discussed the risks and benefits of transplantation and patient attended transplant education class where the following was disclosed:\par  \par Reviewed factors affecting survival and morbidity while on dialysis, the transplant wait list and reviewed zackery-operative and long-term risk factors affecting outcome in kidney transplantation. \par  \par One year SRTR outcomes for national and [facility] were discussed in regards to patient survival and graft survival after transplantation. \par  \par Details of transplant surgery, including complications were discussed.\par Immunosuppression and complications including infection including life threatening sepsis and opportunistic infections, malignancy and new onset diabetes were discussed. \par  \par Benefits of live donor transplantation as well as variability in wait times across regions and multiple listing were discussed. \par KDPI >85% and PHS high risk criteria donors were discussed. \par HCV kidney transplantation was discussed.\par  \par Will proceed with completing/ updating work up and listing for transplant/ live donor transplant once work up is reviewed and found to be acceptable by multidisciplinary listing committee.\par

## 2022-04-13 NOTE — HISTORY OF PRESENT ILLNESS
[TextBox_42] : MAMI WOODWARD is a 55 year old male who presents for kidney transplant evaluation. \par Cause of ESRD : long standing DM. Known CKD for 5 years. \par Nephrologist: Dr Cobb , on IHD for 2018 , Access: RAVF. \par reports that he still makes good amounts of urine. \par  \par Other PMH :\par Cardiac - HTN X 20 years. h/o MI X s/p PCI 2011, Carotid stenosis s/p right carotid stent. . \par Pulmonary- no h/o COPD, Asthma\par Infections- no h/o TB, HIV, Hepatitis or covid \par Heme- no h/o malignancy or bleeding disorders.No DVT/PE\par Endo- has diabetes X 15 years. was on insulin, now on Januvia with retinopathy and neuropathy. no Thyroid disease\par Neuro- h/o 2  CVA once in  2013 and in 2018/2019 with some residual right sided weakness \par Psych- no suicidal ideation, depression or anxiety. \par Sensitization events- no previous blood transfusions or previous transplant\par No infections or hospitalizations in the last 6 months \par \par Surgical h/o : none\par Functional status: walks with a cane for balance , can do 3 blocks and can climbs 3 floor every day ( lives in a walk up apartment) \par Social history: lives with sister.  has 1 son who is 38 years old. Is a non smoker, no alcohol or illicit use. retired , used to repair photocopy machines. \par Family history: Mother had DM. No family h/o kidney disease or malignancy.\par

## 2022-04-15 LAB
ABO + RH PNL BLD: NORMAL
ALBUMIN SERPL ELPH-MCNC: 4.9 G/DL
ALP BLD-CCNC: 122 U/L
ALT SERPL-CCNC: 16 U/L
ANION GAP SERPL CALC-SCNC: 19 MMOL/L
APPEARANCE: CLEAR
AST SERPL-CCNC: 21 U/L
BACTERIA: NEGATIVE
BASOPHILS # BLD AUTO: 0.12 K/UL
BASOPHILS NFR BLD AUTO: 1.5 %
BILIRUB SERPL-MCNC: 0.3 MG/DL
BILIRUBIN URINE: NEGATIVE
BLOOD URINE: NEGATIVE
BUN SERPL-MCNC: 36 MG/DL
C PEPTIDE SERPL-MCNC: 14.4 NG/ML
CALCIUM SERPL-MCNC: 9.6 MG/DL
CHLORIDE SERPL-SCNC: 93 MMOL/L
CHOLEST SERPL-MCNC: 154 MG/DL
CMV IGG SERPL QL: 7.5 U/ML
CMV IGG SERPL-IMP: POSITIVE
CO2 SERPL-SCNC: 26 MMOL/L
COLOR: YELLOW
COVID-19 NUCLEOCAPSID  GAM ANTIBODY INTERPRETATION: POSITIVE
COVID-19 SPIKE DOMAIN ANTIBODY INTERPRETATION: POSITIVE
CREAT SERPL-MCNC: 6.83 MG/DL
CREAT SPEC-SCNC: 141 MG/DL
CREAT/PROT UR: 4.6 RATIO
EBV EA AB SER IA-ACNC: <5 U/ML
EBV EA AB TITR SER IF: POSITIVE
EBV EA IGG SER QL IA: >600 U/ML
EBV EA IGG SER-ACNC: NEGATIVE
EBV EA IGM SER IA-ACNC: NEGATIVE
EBV PATRN SPEC IB-IMP: NORMAL
EBV VCA IGG SER IA-ACNC: 727 U/ML
EBV VCA IGM SER QL IA: <10 U/ML
EGFR: 9 ML/MIN/1.73M2
EOSINOPHIL # BLD AUTO: 0.9 K/UL
EOSINOPHIL NFR BLD AUTO: 11.1 %
EPSTEIN-BARR VIRUS CAPSID ANTIGEN IGG: POSITIVE
ESTIMATED AVERAGE GLUCOSE: 140 MG/DL
GLUCOSE QUALITATIVE U: NORMAL
GLUCOSE SERPL-MCNC: 144 MG/DL
HAV IGM SER QL: NONREACTIVE
HBA1C MFR BLD HPLC: 6.5 %
HBV CORE IGG+IGM SER QL: NONREACTIVE
HBV SURFACE AB SER QL: REACTIVE
HBV SURFACE AB SERPL IA-ACNC: 41.4 MIU/ML
HBV SURFACE AG SER QL: NONREACTIVE
HCT VFR BLD CALC: 43 %
HCV AB SER QL: NONREACTIVE
HCV S/CO RATIO: 0.21 S/CO
HDLC SERPL-MCNC: 33 MG/DL
HEPATITIS A IGG ANTIBODY: REACTIVE
HGB BLD-MCNC: 13.9 G/DL
HIV1+2 AB SPEC QL IA.RAPID: NONREACTIVE
HSV 1+2 IGG SER IA-IMP: NEGATIVE
HSV 1+2 IGG SER IA-IMP: POSITIVE
HSV1 IGG SER QL: 40.9 INDEX
HSV2 IGG SER QL: 0.19 INDEX
HYALINE CASTS: 4 /LPF
IMM GRANULOCYTES NFR BLD AUTO: 0.2 %
KETONES URINE: NEGATIVE
LDLC SERPL CALC-MCNC: 70 MG/DL
LEUKOCYTE ESTERASE URINE: NEGATIVE
LYMPHOCYTES # BLD AUTO: 1.85 K/UL
LYMPHOCYTES NFR BLD AUTO: 22.7 %
M TB IFN-G BLD-IMP: NEGATIVE
MAGNESIUM SERPL-MCNC: 2.6 MG/DL
MAN DIFF?: NORMAL
MCHC RBC-ENTMCNC: 29.9 PG
MCHC RBC-ENTMCNC: 32.3 GM/DL
MCV RBC AUTO: 92.5 FL
MICROSCOPIC-UA: NORMAL
MONOCYTES # BLD AUTO: 0.92 K/UL
MONOCYTES NFR BLD AUTO: 11.3 %
NEUTROPHILS # BLD AUTO: 4.33 K/UL
NEUTROPHILS NFR BLD AUTO: 53.2 %
NITRITE URINE: NEGATIVE
NONHDLC SERPL-MCNC: 121 MG/DL
PH URINE: 7.5
PHOSPHATE SERPL-MCNC: 4.8 MG/DL
PLATELET # BLD AUTO: 312 K/UL
POTASSIUM SERPL-SCNC: 4.4 MMOL/L
PROT SERPL-MCNC: 8.1 G/DL
PROT UR-MCNC: 646 MG/DL
PROTEIN URINE: ABNORMAL
PSA SERPL-MCNC: 1.27 NG/ML
QUANTIFERON TB PLUS MITOGEN MINUS NIL: 6.26 IU/ML
QUANTIFERON TB PLUS NIL: 0.02 IU/ML
QUANTIFERON TB PLUS TB1 MINUS NIL: 0.02 IU/ML
QUANTIFERON TB PLUS TB2 MINUS NIL: 0.03 IU/ML
RBC # BLD: 4.65 M/UL
RBC # FLD: 11.8 %
RED BLOOD CELLS URINE: 3 /HPF
RUBV IGG FLD-ACNC: 26 INDEX
RUBV IGG SER-IMP: POSITIVE
SARS-COV-2 AB SERPL IA-ACNC: 239 U/ML
SARS-COV-2 AB SERPL QL IA: 21.3 INDEX
SODIUM SERPL-SCNC: 138 MMOL/L
SPECIFIC GRAVITY URINE: 1.02
SQUAMOUS EPITHELIAL CELLS: 1 /HPF
T GONDII AB SER-IMP: POSITIVE
T GONDII IGG SER QL: 19.7 IU/ML
T PALLIDUM AB SER QL IA: NEGATIVE
TRIGL SERPL-MCNC: 253 MG/DL
URATE SERPL-MCNC: 4.8 MG/DL
UROBILINOGEN URINE: NORMAL
VZV AB TITR SER: POSITIVE
VZV IGG SER IF-ACNC: 1407 INDEX
WBC # FLD AUTO: 8.14 K/UL
WHITE BLOOD CELLS URINE: 2 /HPF

## 2022-04-16 LAB — EBV DNA SERPL NAA+PROBE-ACNC: NOT DETECTED IU/ML

## 2022-04-18 ENCOUNTER — OUTPATIENT (OUTPATIENT)
Dept: OUTPATIENT SERVICES | Facility: HOSPITAL | Age: 57
LOS: 1 days | End: 2022-04-18
Payer: COMMERCIAL

## 2022-04-18 ENCOUNTER — APPOINTMENT (OUTPATIENT)
Dept: CT IMAGING | Facility: IMAGING CENTER | Age: 57
End: 2022-04-18

## 2022-04-18 DIAGNOSIS — Z98.890 OTHER SPECIFIED POSTPROCEDURAL STATES: Chronic | ICD-10-CM

## 2022-04-18 DIAGNOSIS — Z95.828 PRESENCE OF OTHER VASCULAR IMPLANTS AND GRAFTS: Chronic | ICD-10-CM

## 2022-04-18 DIAGNOSIS — H26.9 UNSPECIFIED CATARACT: Chronic | ICD-10-CM

## 2022-04-18 DIAGNOSIS — Z01.818 ENCOUNTER FOR OTHER PREPROCEDURAL EXAMINATION: ICD-10-CM

## 2022-04-18 DIAGNOSIS — Z95.5 PRESENCE OF CORONARY ANGIOPLASTY IMPLANT AND GRAFT: Chronic | ICD-10-CM

## 2022-04-18 PROCEDURE — 71250 CT THORAX DX C-: CPT

## 2022-04-18 PROCEDURE — 71250 CT THORAX DX C-: CPT | Mod: 26

## 2022-04-27 ENCOUNTER — APPOINTMENT (OUTPATIENT)
Dept: CARDIOLOGY | Facility: CLINIC | Age: 57
End: 2022-04-27
Payer: COMMERCIAL

## 2022-04-27 PROCEDURE — A9500: CPT

## 2022-04-27 PROCEDURE — 99072 ADDL SUPL MATRL&STAF TM PHE: CPT

## 2022-04-27 PROCEDURE — 93015 CV STRESS TEST SUPVJ I&R: CPT

## 2022-04-27 PROCEDURE — 78452 HT MUSCLE IMAGE SPECT MULT: CPT

## 2022-05-05 ENCOUNTER — APPOINTMENT (OUTPATIENT)
Dept: CARDIOLOGY | Facility: CLINIC | Age: 57
End: 2022-05-05
Payer: COMMERCIAL

## 2022-05-05 PROCEDURE — 93306 TTE W/DOPPLER COMPLETE: CPT

## 2022-05-05 PROCEDURE — 99072 ADDL SUPL MATRL&STAF TM PHE: CPT

## 2022-05-09 ENCOUNTER — NON-APPOINTMENT (OUTPATIENT)
Age: 57
End: 2022-05-09

## 2022-05-10 ENCOUNTER — NON-APPOINTMENT (OUTPATIENT)
Age: 57
End: 2022-05-10

## 2022-06-13 ENCOUNTER — APPOINTMENT (OUTPATIENT)
Dept: VASCULAR SURGERY | Facility: CLINIC | Age: 57
End: 2022-06-13
Payer: MEDICARE

## 2022-06-13 VITALS
SYSTOLIC BLOOD PRESSURE: 199 MMHG | HEIGHT: 68 IN | DIASTOLIC BLOOD PRESSURE: 104 MMHG | HEART RATE: 66 BPM | TEMPERATURE: 97.2 F | BODY MASS INDEX: 24.25 KG/M2 | WEIGHT: 160 LBS

## 2022-06-13 VITALS — SYSTOLIC BLOOD PRESSURE: 196 MMHG | HEART RATE: 68 BPM | DIASTOLIC BLOOD PRESSURE: 95 MMHG

## 2022-06-13 PROCEDURE — 99213 OFFICE O/P EST LOW 20 MIN: CPT

## 2022-06-13 PROCEDURE — 93880 EXTRACRANIAL BILAT STUDY: CPT

## 2022-06-13 NOTE — PHYSICAL EXAM
[de-identified] : On physical examination the patient is in no acute distress and neurologically intact. Right neck incision is well healed. The lungs are clear to auscultation and the heart has a regular rate and rhythm. Abdomen is benign. Bilateral femoral and pedal pulses are palpable. \par \par

## 2022-06-13 NOTE — ASSESSMENT
[FreeTextEntry1] : In summary, Mr. Ashby presents s/p right TCAR. A carotid duplex today showed patent repair, <50% left carotid stenosis and antegrade flow in the vertebral arteries. I instructed him to continue aspirin and statin therapy. He will follow up in one year. \par \par Of note, his BP is significantly elevated today, as he did not take his antihypertensive medications this AM. I instructed him to return home immediately following his visit and to take his medications.

## 2022-06-13 NOTE — HISTORY OF PRESENT ILLNESS
[FreeTextEntry1] : I have just had the pleasure of seeing Mr. Osiris Ashby in follow up for severe right carotid artery stenosis. Mr. Ashby is now s/p TCAR, performed on 4/27/21. Since surgery, Mr. Ashby has been doing well. His neck incision has healed and he denies erythema, induration, fluctuance or drainage. He denies any new focal neurologic deficits including extremity weakness or paresthesia, facial droop or vision problems. He is taking his Aspirin and Lipitor. He is otherwise without complaints.\par  \par His medical history is significant for ESRD on HD via right arm fistula, CAD s/p PCI/stents, HTN, and HLD\par \par Medications: Lipitor, Auryxia, Coreg, Famotidine, Lasix, Hydralazine, Januvia, Nifedipine, Aspirin, Nebulizers\par \par Allergies: NKDA\par \par Social history: Non-smoker\par \par FH: NC\par

## 2023-03-30 ENCOUNTER — NON-APPOINTMENT (OUTPATIENT)
Age: 58
End: 2023-03-30

## 2023-03-31 ENCOUNTER — LABORATORY RESULT (OUTPATIENT)
Age: 58
End: 2023-03-31

## 2023-03-31 ENCOUNTER — APPOINTMENT (OUTPATIENT)
Dept: NEPHROLOGY | Facility: CLINIC | Age: 58
End: 2023-03-31
Payer: MEDICARE

## 2023-03-31 VITALS
BODY MASS INDEX: 25.16 KG/M2 | HEIGHT: 68 IN | WEIGHT: 166 LBS | OXYGEN SATURATION: 100 % | RESPIRATION RATE: 15 BRPM | HEART RATE: 82 BPM | SYSTOLIC BLOOD PRESSURE: 152 MMHG | TEMPERATURE: 98 F | DIASTOLIC BLOOD PRESSURE: 79 MMHG

## 2023-03-31 PROCEDURE — 99072 ADDL SUPL MATRL&STAF TM PHE: CPT

## 2023-03-31 PROCEDURE — 99214 OFFICE O/P EST MOD 30 MIN: CPT

## 2023-03-31 RX ORDER — ASPIRIN 81 MG/1
81 TABLET, FILM COATED ORAL
Refills: 0 | Status: ACTIVE | COMMUNITY
Start: 2023-03-31

## 2023-03-31 NOTE — CONSULT LETTER
[Dear  ___] : Dear  [unfilled], [Consult Letter:] : I had the pleasure of evaluating your patient, [unfilled]. [Please see my note below.] : Please see my note below. [Consult Closing:] : Thank you very much for allowing me to participate in the care of this patient.  If you have any questions, please do not hesitate to contact me. [Sincerely,] : Sincerely, [FreeTextEntry3] : Dayday Trotter, DO

## 2023-03-31 NOTE — PHYSICAL EXAM
[General Appearance - Alert] : alert [Sclera] : the sclera and conjunctiva were normal [PERRL With Normal Accommodation] : pupils were equal in size, round, and reactive to light [Extraocular Movements] : extraocular movements were intact [Outer Ear] : the ears and nose were normal in appearance [Oropharynx] : the oropharynx was normal [Neck Appearance] : the appearance of the neck was normal [Neck Cervical Mass (___cm)] : no neck mass was observed [Jugular Venous Distention Increased] : there was no jugular-venous distention [Thyroid Diffuse Enlargement] : the thyroid was not enlarged [Thyroid Nodule] : there were no palpable thyroid nodules [Auscultation Breath Sounds / Voice Sounds] : lungs were clear to auscultation bilaterally [Heart Rate And Rhythm] : heart rate was normal and rhythm regular [Heart Sounds] : normal S1 and S2 [Heart Sounds Gallop] : no gallops [Murmurs] : no murmurs [Heart Sounds Pericardial Friction Rub] : no pericardial rub [Bowel Sounds] : normal bowel sounds [Abdomen Soft] : soft [Abdomen Tenderness] : non-tender [] : no hepato-splenomegaly [Abdomen Mass (___ Cm)] : no abdominal mass palpated [No CVA Tenderness] : no ~M costovertebral angle tenderness [No Spinal Tenderness] : no spinal tenderness [Abnormal Walk] : normal gait [Nail Clubbing] : no clubbing  or cyanosis of the fingernails [Musculoskeletal - Swelling] : no joint swelling seen [Motor Tone] : muscle strength and tone were normal [Graft] : graft [Normal] : normal [Deep Tendon Reflexes (DTR)] : deep tendon reflexes were 2+ and symmetric [Sensation] : the sensory exam was normal to light touch and pinprick [No Focal Deficits] : no focal deficits [Oriented To Time, Place, And Person] : oriented to person, place, and time [Impaired Insight] : insight and judgment were intact [Affect] : the affect was normal [Edema] : there was no peripheral edema [FreeTextEntry1] : left pedal pulse present, right difficult to palpate.

## 2023-03-31 NOTE — HISTORY OF PRESENT ILLNESS
[TextBox_42] : MAMI WOODWARD is a 57 year old male who presents for kidney transplant evaluation.  He is blood type O.  \par Cause of ESRD : long standing DM2.  Used to take insulin but no longer takes medications for diabetes.  \par Nephrologist: Dr Cobb , on IHD in 2018 , Access: RAVF. \par Urinates a small amount.  \par  \par Other PMH :\par Cardiac - HTN X 20 years. h/o MI X s/p PCI 2011, Carotid stenosis s/p right carotid stent. 2 strokes, 3-4 years ago.  Has residual right sided weakness and uses a cane.  \par Endo- has diabetes X 15 years. was on insulin, now not on any meds. \par Neuro- h/o 2  CVA once in  2013 and in 2018/2019 with some residual right sided weakness \par \par Sensitization events- no previous blood transfusions or previous transplant\par He had a cardiac stent placed at [facility] recently - about 2-3 months ago.  Placed after abnormal stress test.  No chest pain but has been breathing very heavy for the last few months.  \par \par Functional status: walks with a cane for balance , can walk 2-3 blocks and can climbs 3 floor every day ( lives in a walk up apartment) \par \par Last Seen 2/17/2021\par Listed \par Dialysis 2017\par ABO O\par PRA 12/15/22 0% \par \par most recent testing\par Cardiac- Dr Eckert and cleared for transplant 5/9/22\par ECG: 3/28/2022 sinus rhythm, normal trace. \par \par Cardiac Cath/PCI: 4/23/2021 left main minor luminal irregularity, proximal LAD patent stent, mid LAD 40% stenosis, 1st diagonal 100% , Circumflex small OM1 with 100% , RCA with 50% proximal lesion and right posterolateral 100% .\par \par Stress 4/27/22 abnormal, EF 52% but unchanged from a year earlier and consistent with coronary anatomy documented one year ago.\par \par ECHO 5/5/22 EF 55-60% confirming ventricular wall motion abnormalities unchanged from past and consistent with documented anatomy and myocardial perfusion imaging. Overall ejection fraction is satisfactory, right heart is normal and there is no significant valvular pathology.\par \par Thus one year later cardiology findings are stable and from cardiology perspective remains a satisfactory candidate for renal transplant with no contraindication to surgery and anesthesia. \par \par Radiology \par Chest CT no contrast. 4/27/22 Stable areas of peripheral clustered nodularity/ ground glass in the right lung associated with mild bronchiectasis. no new findings. \par Ct abd and pelvis with and without contrast. 3/22/21 cortical thinning no hydro bilaterally. minimal atheromatous plaque. patchy ground glass opacities in the right lung, likely infectious or inflammatory. \par \par Cancer Screening\par PSA 4/15/22 1.27\par Colonoscopy 3/9/22 fair prep, internal hemorrhoids polyp in the sigmoid and cecum. repeat 3 years path neg for metaplasia. \par

## 2023-03-31 NOTE — PLAN
[FreeTextEntry1] : 1. ESRD on dialysis since 2017-  Mr. MAMI WOODWARD  Is a high risk candidate for kidney transplantation because of CAD, PVD, CVAX2, dyspnea and long standing DM . no potential living donors. \par 2.Cardiac risk: h/o MI s/p PCI in 2011 . Cardiac cath on 4/23/2021 left main minor luminal irregularity, proximal LAD patent stent, mid LAD 40% stenosis, 1st diagonal 100% , Circumflex small OM1 with 100% , RCA with 50% proximal lesion and right posterolateral 100% .  Has had another cath at [facility] with stent placement - will need to obtain report.  Will need to be on hold for 6 months post PCI.  Will have to be status 7 for now.  \par 3. Cancer screening:  colonoscopy done in Feb 2022, wnl.  PSA wnl\par 4. IImaging - repeat CT a/p\par 5. CVA - still with right sided weakness.  repeat carotid duplex.\par 6.  Dyspnea/ Breathing heavy.  - check for anemia.  if not significant needs pulmonary evaluation\par \par Pt is high risk for transplant - will make status 7 and discuss candidacy once we obtain testing.  \par \par I have personally discussed the risks and benefits of transplantation and patient attended transplant education class where the following was disclosed:\par  \par Reviewed factors affecting survival and morbidity while on dialysis, the transplant wait list and reviewed zackery-operative and long-term risk factors affecting outcome in kidney transplantation. \par  \par One year SRTR outcomes for national and [facility] were discussed in regards to patient survival and graft survival after transplantation. \par  \par Details of transplant surgery, including complications were discussed.\par Immunosuppression and complications including infection including life threatening sepsis and opportunistic infections, malignancy and new onset diabetes were discussed. \par  \par Benefits of live donor transplantation as well as variability in wait times across regions and multiple listing were discussed. \par KDPI >85% and PHS high risk criteria donors were discussed. \par HCV kidney transplantation was discussed.\par  \par Will proceed with completing/ updating work up and listing for transplant/ live donor transplant once work up is reviewed and found to be acceptable by multidisciplinary listing committee.\par

## 2023-04-05 ENCOUNTER — NON-APPOINTMENT (OUTPATIENT)
Age: 58
End: 2023-04-05

## 2023-04-05 LAB
ABO + RH PNL BLD: NORMAL
ALBUMIN SERPL ELPH-MCNC: 4.7 G/DL
ALP BLD-CCNC: 116 U/L
ALT SERPL-CCNC: 18 U/L
ANION GAP SERPL CALC-SCNC: 17 MMOL/L
APPEARANCE: CLEAR
AST SERPL-CCNC: 19 U/L
BASOPHILS # BLD AUTO: 0.13 K/UL
BASOPHILS NFR BLD AUTO: 1.4 %
BILIRUB SERPL-MCNC: 0.4 MG/DL
BILIRUBIN URINE: NEGATIVE
BLOOD URINE: NEGATIVE
BUN SERPL-MCNC: 21 MG/DL
C PEPTIDE SERPL-MCNC: 12.3 NG/ML
CALCIUM SERPL-MCNC: 10.1 MG/DL
CHLORIDE SERPL-SCNC: 99 MMOL/L
CHOLEST SERPL-MCNC: 143 MG/DL
CK SERPL-CCNC: 54 U/L
CMV IGG SERPL QL: 4.8 U/ML
CMV IGG SERPL-IMP: POSITIVE
CO2 SERPL-SCNC: 25 MMOL/L
COLOR: YELLOW
COVID-19 SPIKE DOMAIN ANTIBODY INTERPRETATION: POSITIVE
CREAT SERPL-MCNC: 5.66 MG/DL
CREAT SPEC-SCNC: 190 MG/DL
CREAT/PROT UR: 4.3 RATIO
CRP SERPL-MCNC: 3 MG/L
EBV EA AB SER IA-ACNC: <5 U/ML
EBV EA AB TITR SER IF: POSITIVE
EBV EA IGG SER QL IA: >600 U/ML
EBV EA IGG SER-ACNC: NEGATIVE
EBV EA IGM SER IA-ACNC: NEGATIVE
EBV PATRN SPEC IB-IMP: NORMAL
EBV VCA IGG SER IA-ACNC: 749 U/ML
EBV VCA IGM SER QL IA: <10 U/ML
EGFR: 11 ML/MIN/1.73M2
EOSINOPHIL # BLD AUTO: 0.71 K/UL
EOSINOPHIL NFR BLD AUTO: 7.9 %
EPSTEIN-BARR VIRUS CAPSID ANTIGEN IGG: POSITIVE
ERYTHROCYTE [SEDIMENTATION RATE] IN BLOOD BY WESTERGREN METHOD: 85 MM/HR
ESTIMATED AVERAGE GLUCOSE: 148 MG/DL
GLUCOSE QUALITATIVE U: NORMAL
GLUCOSE SERPL-MCNC: 166 MG/DL
HAV IGM SER QL: NONREACTIVE
HBA1C MFR BLD HPLC: 6.8 %
HBV CORE IGG+IGM SER QL: NONREACTIVE
HBV SURFACE AB SER QL: REACTIVE
HBV SURFACE AB SERPL IA-ACNC: 34.9 MIU/ML
HBV SURFACE AG SER QL: NONREACTIVE
HCT VFR BLD CALC: 38.9 %
HCV AB SER QL: NONREACTIVE
HCV S/CO RATIO: 0.24 S/CO
HDLC SERPL-MCNC: 33 MG/DL
HEPATITIS A IGG ANTIBODY: REACTIVE
HGB BLD-MCNC: 12.7 G/DL
HIV1+2 AB SPEC QL IA.RAPID: NONREACTIVE
HSV 1+2 IGG SER IA-IMP: NEGATIVE
HSV 1+2 IGG SER IA-IMP: POSITIVE
HSV 1+2 IGG SER IA-IMP: POSITIVE
HSV1 IGG SER QL: 36.4 INDEX
HSV1 IGG SER QL: 36.4 INDEX
HSV2 IGG SER QL: 0.2 INDEX
IMM GRANULOCYTES NFR BLD AUTO: 0.3 %
KETONES URINE: NEGATIVE
LDLC SERPL CALC-MCNC: 60 MG/DL
LEUKOCYTE ESTERASE URINE: NEGATIVE
LYMPHOCYTES # BLD AUTO: 1.7 K/UL
LYMPHOCYTES NFR BLD AUTO: 18.9 %
M TB IFN-G BLD-IMP: NEGATIVE
MAGNESIUM SERPL-MCNC: 2.3 MG/DL
MAN DIFF?: NORMAL
MCHC RBC-ENTMCNC: 30.2 PG
MCHC RBC-ENTMCNC: 32.6 GM/DL
MCV RBC AUTO: 92.6 FL
MONOCYTES # BLD AUTO: 0.69 K/UL
MONOCYTES NFR BLD AUTO: 7.7 %
NEUTROPHILS # BLD AUTO: 5.73 K/UL
NEUTROPHILS NFR BLD AUTO: 63.8 %
NITRITE URINE: NEGATIVE
NONHDLC SERPL-MCNC: 110 MG/DL
PH URINE: 6.5
PHOSPHATE SERPL-MCNC: 5.1 MG/DL
PLATELET # BLD AUTO: 332 K/UL
POTASSIUM SERPL-SCNC: 4.5 MMOL/L
PROT SERPL-MCNC: 7.9 G/DL
PROT UR-MCNC: 815 MG/DL
PROTEIN URINE: ABNORMAL
PSA SERPL-MCNC: 1.29 NG/ML
QUANTIFERON TB PLUS MITOGEN MINUS NIL: >10 IU/ML
QUANTIFERON TB PLUS NIL: 0.02 IU/ML
QUANTIFERON TB PLUS TB1 MINUS NIL: 0 IU/ML
QUANTIFERON TB PLUS TB2 MINUS NIL: 0 IU/ML
RBC # BLD: 4.2 M/UL
RBC # FLD: 12.4 %
RUBV IGG FLD-ACNC: 26.3 INDEX
RUBV IGG SER-IMP: POSITIVE
SARS-COV-2 AB SERPL IA-ACNC: >250 U/ML
SARS-COV-2 N GENE NPH QL NAA+PROBE: NOT DETECTED
SODIUM SERPL-SCNC: 141 MMOL/L
SPECIFIC GRAVITY URINE: 1.02
T GONDII AB SER-IMP: POSITIVE
T GONDII IGG SER QL: 25.5 IU/ML
T PALLIDUM AB SER QL IA: NEGATIVE
T3 SERPL-MCNC: 111 NG/DL
T4 FREE SERPL-MCNC: 1.4 NG/DL
TRIGL SERPL-MCNC: 252 MG/DL
TSH SERPL-ACNC: 1.83 UIU/ML
URATE SERPL-MCNC: 4 MG/DL
UROBILINOGEN URINE: NORMAL
VZV AB TITR SER: POSITIVE
VZV IGG SER IF-ACNC: 1427 INDEX
WBC # FLD AUTO: 8.99 K/UL

## 2023-04-13 LAB
HSV1 IGM SER QL: NEGATIVE
HSV2 AB FLD-ACNC: NEGATIVE

## 2023-06-19 ENCOUNTER — APPOINTMENT (OUTPATIENT)
Dept: VASCULAR SURGERY | Facility: CLINIC | Age: 58
End: 2023-06-19

## 2023-12-15 ENCOUNTER — APPOINTMENT (OUTPATIENT)
Dept: NEPHROLOGY | Facility: CLINIC | Age: 58
End: 2023-12-15

## 2024-03-01 ENCOUNTER — APPOINTMENT (OUTPATIENT)
Dept: NEPHROLOGY | Facility: CLINIC | Age: 59
End: 2024-03-01
Payer: COMMERCIAL

## 2024-03-01 ENCOUNTER — LABORATORY RESULT (OUTPATIENT)
Age: 59
End: 2024-03-01

## 2024-03-01 VITALS
DIASTOLIC BLOOD PRESSURE: 74 MMHG | OXYGEN SATURATION: 99 % | BODY MASS INDEX: 24.25 KG/M2 | WEIGHT: 160 LBS | SYSTOLIC BLOOD PRESSURE: 166 MMHG | HEIGHT: 68 IN | TEMPERATURE: 98.3 F | HEART RATE: 84 BPM | RESPIRATION RATE: 15 BRPM

## 2024-03-01 DIAGNOSIS — E11.9 TYPE 2 DIABETES MELLITUS W/OUT COMPLICATIONS: ICD-10-CM

## 2024-03-01 PROCEDURE — 99214 OFFICE O/P EST MOD 30 MIN: CPT

## 2024-03-01 NOTE — HISTORY OF PRESENT ILLNESS
[TextBox_42] : MAMI WOODWARD is a 58 year old male who presents for kidney transplant evaluation.  He is blood type O.   Cause of ESRD : long standing DM2.  Used to take insulin but no longer takes medications for diabetes.   Nephrologist: Dr Cobb , on IHD in 2018 , Access: RAVF.  Urinates a small amount.   He was admitted to [facility] 1/2024 with Covid.  Did not require ventilation.   His breathing is still heavy.    Other PMH : Cardiac - HTN X 20 years. h/o MI X s/p PCI 2011, Carotid stenosis s/p right carotid stent. 2 strokes, 3-4 years ago.  Has residual right sided weakness and uses a cane.   Endo- has diabetes X 15 years. was on insulin, now not on any meds.  Neuro- h/o 2  CVA once in  2013 and in 2018/2019 with some residual right sided weakness.  Weak but can still walk.    Sensitization events- no previous blood transfusions or previous transplant He had a cardiac stent placed at [facility] in 2022.  Since then no stents, no chest pain or dyspnea on exertion.    Functional status: walks with a cane for balance , can walk 2-3 blocks and can climbs 3 floor every day ( lives in a walk up apartment)   Last Seen 3/2023 h/o MI s/p PCI in 2011. Cardiac cath on 4/23/2021 left main minor luminal irregularity, proximal LAD patent stent, mid LAD 40% stenosis, 1st diagonal 100% , Circumflex small OM1 with 100% , RCA with 50% proximal lesion and right posterolateral 100% . Has had another cath at [facility] with stent placement in 2022.   Cardiac cath records from 11/2022 obtained. and scanned into chart.  Has 3v disease, LAD was stented.    Dialysis 2017 ABO O  PRA 12/15/22 0% most recent testing Cardiac- Dr Eckert and cleared for transplant 5/9/22 ECG: 3/28/2022 sinus rhythm, normal trace.  Cardiac Cath/PCI: 4/23/2021 left main minor luminal irregularity, proximal LAD patent stent, mid LAD 40% stenosis, 1st diagonal 100% , Circumflex small OM1 with 100% , RCA with 50% proximal lesion and right posterolateral 100% .  Stress 4/27/22 abnormal, EF 52% but unchanged from a year earlier and consistent with coronary anatomy documented one year ago.  ECHO 5/5/22 EF 55-60% confirming ventricular wall motion abnormalities unchanged from past and consistent with documented anatomy and myocardial perfusion imaging. Overall ejection fraction is satisfactory, right heart is normal and there is no significant valvular pathology.  Radiology  Chest CT no contrast. 4/27/22 Stable areas of peripheral clustered nodularity/ ground glass in the right lung associated with mild bronchiectasis. no new findings.  Ct abd and pelvis with and without contrast. 3/22/21 cortical thinning no hydro bilaterally. minimal atheromatous plaque. patchy ground glass opacities in the right lung, likely infectious or inflammatory.  Cancer Screening  PSA 4/15/22 1.27 Colonoscopy 3/9/22 fair prep, internal hemorrhoids polyp in the sigmoid and cecum. repeat 3 years path neg for metaplasia.

## 2024-03-01 NOTE — PHYSICAL EXAM
[General Appearance - Alert] : alert [Sclera] : the sclera and conjunctiva were normal [PERRL With Normal Accommodation] : pupils were equal in size, round, and reactive to light [Outer Ear] : the ears and nose were normal in appearance [Extraocular Movements] : extraocular movements were intact [Oropharynx] : the oropharynx was normal [Neck Appearance] : the appearance of the neck was normal [Thyroid Diffuse Enlargement] : the thyroid was not enlarged [Jugular Venous Distention Increased] : there was no jugular-venous distention [Neck Cervical Mass (___cm)] : no neck mass was observed [Thyroid Nodule] : there were no palpable thyroid nodules [Auscultation Breath Sounds / Voice Sounds] : lungs were clear to auscultation bilaterally [Heart Rate And Rhythm] : heart rate was normal and rhythm regular [Heart Sounds] : normal S1 and S2 [Murmurs] : no murmurs [Heart Sounds Gallop] : no gallops [Heart Sounds Pericardial Friction Rub] : no pericardial rub [Edema] : there was no peripheral edema [Bowel Sounds] : normal bowel sounds [Abdomen Tenderness] : non-tender [Abdomen Soft] : soft [Abdomen Mass (___ Cm)] : no abdominal mass palpated [] : no hepato-splenomegaly [No Spinal Tenderness] : no spinal tenderness [No CVA Tenderness] : no ~M costovertebral angle tenderness [Abnormal Walk] : normal gait [Nail Clubbing] : no clubbing  or cyanosis of the fingernails [Musculoskeletal - Swelling] : no joint swelling seen [Motor Tone] : muscle strength and tone were normal [Graft] : graft [Normal] : normal [Deep Tendon Reflexes (DTR)] : deep tendon reflexes were 2+ and symmetric [Sensation] : the sensory exam was normal to light touch and pinprick [No Focal Deficits] : no focal deficits [Oriented To Time, Place, And Person] : oriented to person, place, and time [Impaired Insight] : insight and judgment were intact [Affect] : the affect was normal [FreeTextEntry1] : left pedal pulse present, right difficult to palpate.

## 2024-03-01 NOTE — PLAN
[FreeTextEntry1] : 1. ESRD on dialysis since 2017-  Mr. MAMI WOODWARD  Is a high risk candidate for kidney transplantation because of CAD, PVD, CVAX2, dyspnea and long standing DM . no potential living donors.   2.Cardiac risk: h/o MI s/p PCI in 2011 . Cardiac cath on 4/23/2021 left main minor luminal irregularity, proximal LAD patent stent, mid LAD 40% stenosis, 1st diagonal 100% , Circumflex small OM1 with 100% , RCA with 50% proximal lesion and right posterolateral 100% .  Has had another cath at [facility] with stent placement - in 11/2022 in LAD.  Needs to be seen by cardiology for clearance.  He has some heavy breathing at baseline, and residual CAD.  Will remain status 7 for now.   3. Cancer screening:  colonoscopy done in Feb 2022, wnl.  PSA wnl 4. IImaging - repeat CT a/p 5. CVA - still with right sided weakness.  repeat carotid duplex. 6.  Dyspnea/ Breathing heavy.  -  needs pulmonary evaluation  Pt is high risk for transplant - will make status 7 and discuss candidacy once we obtain testing.    I have personally discussed the risks and benefits of transplantation and patient attended transplant education class where the following was disclosed:   Reviewed factors affecting survival and morbidity while on dialysis, the transplant wait list and reviewed zackery-operative and long-term risk factors affecting outcome in kidney transplantation.    One year SRTR outcomes for national and [facility] were discussed in regards to patient survival and graft survival after transplantation.    Details of transplant surgery, including complications were discussed. Immunosuppression and complications including infection including life threatening sepsis and opportunistic infections, malignancy and new onset diabetes were discussed.    Benefits of live donor transplantation as well as variability in wait times across regions and multiple listing were discussed.  KDPI >85% and PHS high risk criteria donors were discussed.  HCV kidney transplantation was discussed.   Will proceed with completing/ updating work up and listing for transplant/ live donor transplant once work up is reviewed and found to be acceptable by multidisciplinary listing committee.

## 2024-03-01 NOTE — CONSULT LETTER
[Dear  ___] : Dear  [unfilled], [Consult Letter:] : I had the pleasure of evaluating your patient, [unfilled]. [Please see my note below.] : Please see my note below. [Sincerely,] : Sincerely, [Consult Closing:] : Thank you very much for allowing me to participate in the care of this patient.  If you have any questions, please do not hesitate to contact me. [FreeTextEntry3] : Dayday Trotter, DO

## 2024-03-13 LAB
ABO + RH PNL BLD: NORMAL
ALBUMIN SERPL ELPH-MCNC: 4.6 G/DL
ALP BLD-CCNC: 119 U/L
ALT SERPL-CCNC: 14 U/L
ANION GAP SERPL CALC-SCNC: 15 MMOL/L
APPEARANCE: CLEAR
AST SERPL-CCNC: 16 U/L
BILIRUB SERPL-MCNC: 0.3 MG/DL
BILIRUBIN URINE: NEGATIVE
BLOOD URINE: NEGATIVE
BUN SERPL-MCNC: 23 MG/DL
C PEPTIDE SERPL-MCNC: 16.1 NG/ML
CALCIUM SERPL-MCNC: 9.7 MG/DL
CHLORIDE SERPL-SCNC: 99 MMOL/L
CHOLEST SERPL-MCNC: 149 MG/DL
CK SERPL-CCNC: 36 U/L
CMV IGG SERPL QL: >10 U/ML
CMV IGG SERPL-IMP: POSITIVE
CO2 SERPL-SCNC: 28 MMOL/L
COLOR: NORMAL
COVID-19 SPIKE DOMAIN ANTIBODY INTERPRETATION: POSITIVE
CREAT SERPL-MCNC: 6.07 MG/DL
CREAT SPEC-SCNC: 122 MG/DL
CREAT/PROT UR: 6 RATIO
CRP SERPL-MCNC: 4 MG/L
EBV EA AB SER IA-ACNC: <5 U/ML
EBV EA AB TITR SER IF: POSITIVE
EBV EA IGG SER QL IA: 491 U/ML
EBV EA IGG SER-ACNC: NEGATIVE
EBV EA IGM SER IA-ACNC: NEGATIVE
EBV PATRN SPEC IB-IMP: NORMAL
EBV VCA IGG SER IA-ACNC: 506 U/ML
EBV VCA IGM SER QL IA: <10 U/ML
EGFR: 10 ML/MIN/1.73M2
EPSTEIN-BARR VIRUS CAPSID ANTIGEN IGG: POSITIVE
ERYTHROCYTE [SEDIMENTATION RATE] IN BLOOD BY WESTERGREN METHOD: 24 MM/HR
ESTIMATED AVERAGE GLUCOSE: 117 MG/DL
GLUCOSE QUALITATIVE U: 100 MG/DL
GLUCOSE SERPL-MCNC: 168 MG/DL
HAV IGM SER QL: NONREACTIVE
HBA1C MFR BLD HPLC: 5.7 %
HBV CORE IGG+IGM SER QL: NONREACTIVE
HBV SURFACE AB SER QL: REACTIVE
HBV SURFACE AB SERPL IA-ACNC: 13.1 MIU/ML
HBV SURFACE AG SER QL: NONREACTIVE
HCT VFR BLD CALC: 42 %
HCV AB SER QL: NONREACTIVE
HCV S/CO RATIO: 0.2 S/CO
HDLC SERPL-MCNC: 38 MG/DL
HEPATITIS A IGG ANTIBODY: REACTIVE
HGB BLD-MCNC: 13.5 G/DL
HIV1+2 AB SPEC QL IA.RAPID: NONREACTIVE
HSV 1+2 IGG SER IA-IMP: NEGATIVE
HSV 1+2 IGG SER IA-IMP: POSITIVE
HSV 1+2 IGG SER IA-IMP: POSITIVE
HSV1 IGG SER QL: 53.6 INDEX
HSV1 IGG SER QL: 53.6 INDEX
HSV2 IGG SER QL: 0.21 INDEX
KETONES URINE: NEGATIVE MG/DL
LDLC SERPL CALC-MCNC: 69 MG/DL
LEUKOCYTE ESTERASE URINE: NEGATIVE
M TB IFN-G BLD-IMP: NEGATIVE
MAGNESIUM SERPL-MCNC: 2.4 MG/DL
MCHC RBC-ENTMCNC: 29.9 PG
MCHC RBC-ENTMCNC: 32.1 GM/DL
MCV RBC AUTO: 93.1 FL
NITRITE URINE: NEGATIVE
NONHDLC SERPL-MCNC: 111 MG/DL
PH URINE: 7.5
PHOSPHATE SERPL-MCNC: 4.3 MG/DL
PLATELET # BLD AUTO: 309 K/UL
POTASSIUM SERPL-SCNC: 4 MMOL/L
PROT SERPL-MCNC: 7.7 G/DL
PROT UR-MCNC: 730 MG/DL
PROTEIN URINE: 300 MG/DL
PSA SERPL-MCNC: 1.18 NG/ML
QUANTIFERON TB PLUS MITOGEN MINUS NIL: >10 IU/ML
QUANTIFERON TB PLUS NIL: 0.04 IU/ML
QUANTIFERON TB PLUS TB1 MINUS NIL: 0.01 IU/ML
QUANTIFERON TB PLUS TB2 MINUS NIL: 0 IU/ML
RBC # BLD: 4.51 M/UL
RBC # FLD: 15.1 %
RUBV IGG FLD-ACNC: 22.4 INDEX
RUBV IGG SER-IMP: POSITIVE
SARS-COV-2 AB SERPL IA-ACNC: >250 U/ML
SARS-COV-2 N GENE NPH QL NAA+PROBE: NOT DETECTED
SODIUM SERPL-SCNC: 142 MMOL/L
SPECIFIC GRAVITY URINE: 1.02
T GONDII AB SER-IMP: POSITIVE
T GONDII IGG SER QL: 22.3 IU/ML
T PALLIDUM AB SER QL IA: NEGATIVE
T3 SERPL-MCNC: 99 NG/DL
T4 FREE SERPL-MCNC: 1.2 NG/DL
TRIGL SERPL-MCNC: 266 MG/DL
TSH SERPL-ACNC: 3.38 UIU/ML
URATE SERPL-MCNC: 4.4 MG/DL
UROBILINOGEN URINE: 0.2 MG/DL
VZV AB TITR SER: POSITIVE
VZV IGG SER IF-ACNC: 1586 INDEX
WBC # FLD AUTO: 9.33 K/UL

## 2024-03-20 ENCOUNTER — APPOINTMENT (OUTPATIENT)
Dept: CARDIOLOGY | Facility: CLINIC | Age: 59
End: 2024-03-20
Payer: COMMERCIAL

## 2024-03-20 ENCOUNTER — NON-APPOINTMENT (OUTPATIENT)
Age: 59
End: 2024-03-20

## 2024-03-20 VITALS
SYSTOLIC BLOOD PRESSURE: 140 MMHG | HEART RATE: 88 BPM | BODY MASS INDEX: 24.4 KG/M2 | HEIGHT: 68 IN | DIASTOLIC BLOOD PRESSURE: 80 MMHG | WEIGHT: 161 LBS | OXYGEN SATURATION: 98 %

## 2024-03-20 DIAGNOSIS — Z01.818 ENCOUNTER FOR OTHER PREPROCEDURAL EXAMINATION: ICD-10-CM

## 2024-03-20 DIAGNOSIS — I10 ESSENTIAL (PRIMARY) HYPERTENSION: ICD-10-CM

## 2024-03-20 DIAGNOSIS — I25.10 ATHEROSCLEROTIC HEART DISEASE OF NATIVE CORONARY ARTERY W/OUT ANGINA PECTORIS: ICD-10-CM

## 2024-03-20 PROCEDURE — 99214 OFFICE O/P EST MOD 30 MIN: CPT

## 2024-03-20 PROCEDURE — 93000 ELECTROCARDIOGRAM COMPLETE: CPT

## 2024-03-23 NOTE — CARDIOLOGY SUMMARY
[de-identified] : 3/28/2022 sinus rhythm, normal trace. [de-identified] : 4/23/2021 left main minor luminal irregularity, proximal LAD patent stent, mid LAD 40% stenosis, 1st diagonal 100% , Circumflex small OM1 with 100% , RCA with 50% proximal lesion and right posterolateral 100% .  11/17/2022: Intervention to mid LAD (Synergy XD).

## 2024-03-23 NOTE — DISCUSSION/SUMMARY
[Coronary Artery Disease] : coronary artery disease [Adenosine Stress Test] : adenosine stress test [Responding to Treatment] : responding to treatment [None] : There are no changes in medication management [Patient] : the patient [EKG obtained to assist in diagnosis and management of assessed problem(s)] : EKG obtained to assist in diagnosis and management of assessed problem(s) [de-identified] : maintained on ticagrelor and without aspirin [de-identified] : Carotid stenosis now has had transcatheter right carotid stent [FreeTextEntry1] : Continued consideration for renal transplant and will repeat non-invasive cardiac testing now. [de-identified] : remains on ticagrelor and not on aspirin

## 2024-03-23 NOTE — END OF VISIT
[FreeTextEntry3] : I saw patient with Zenaida Lomas NP and agree with findings and recommendations.

## 2024-03-23 NOTE — PHYSICAL EXAM
[Not Palpable] : not palpable [Normal Rate] : normal [Rhythm Regular] : regular [Normal S1] : normal S1 [Normal S2] : normal S2 [No Murmur] : no murmurs heard [No Pitting Edema] : no pitting edema present [2+] : left 2+ [1+] : left 1+ [No Abnormalities] : the abdominal aorta was not enlarged and no bruit was heard [Normal] : alert and oriented, normal memory [Right Carotid Bruit] : no bruit heard over the right carotid [Left Carotid Bruit] : no bruit heard over the left carotid [de-identified] : right hemiparesis [de-identified] : walks with cane

## 2024-05-01 ENCOUNTER — APPOINTMENT (OUTPATIENT)
Dept: CARDIOLOGY | Facility: CLINIC | Age: 59
End: 2024-05-01
Payer: COMMERCIAL

## 2024-05-01 PROCEDURE — 93306 TTE W/DOPPLER COMPLETE: CPT

## 2024-05-01 PROCEDURE — ZZZZZ: CPT

## 2024-05-09 NOTE — REASON FOR VISIT
Back from CT   [Coronary Artery Disease] : coronary artery disease [Hypertension] : hypertension [Other: ____] : [unfilled] [FreeTextEntry1] : 3/20/2024 Osiris Ashby returns today for routine annual follow up and to maintain his candidacy for a potential renal transplant. Today he reports feeling well and he has no specific cardiac complaints. He continues on hemodialysis without complications. No chest discomfort, shortness of breath, palpitations, lightheadedness or syncope. We have reviewed his medications and he is taking all as directed.

## 2024-05-22 ENCOUNTER — APPOINTMENT (OUTPATIENT)
Dept: CARDIOLOGY | Facility: CLINIC | Age: 59
End: 2024-05-22
Payer: COMMERCIAL

## 2024-05-22 PROCEDURE — 93015 CV STRESS TEST SUPVJ I&R: CPT

## 2024-05-22 PROCEDURE — A9500: CPT

## 2024-05-22 PROCEDURE — 78452 HT MUSCLE IMAGE SPECT MULT: CPT

## 2024-07-25 ENCOUNTER — NON-APPOINTMENT (OUTPATIENT)
Age: 59
End: 2024-07-25

## 2024-09-04 ENCOUNTER — APPOINTMENT (OUTPATIENT)
Dept: CT IMAGING | Facility: CLINIC | Age: 59
End: 2024-09-04
Payer: COMMERCIAL

## 2024-09-04 PROCEDURE — 74177 CT ABD & PELVIS W/CONTRAST: CPT

## 2024-09-04 PROCEDURE — 71260 CT THORAX DX C+: CPT

## 2024-09-06 ENCOUNTER — NON-APPOINTMENT (OUTPATIENT)
Age: 59
End: 2024-09-06

## 2024-09-10 ENCOUNTER — NON-APPOINTMENT (OUTPATIENT)
Age: 59
End: 2024-09-10

## 2024-09-13 ENCOUNTER — APPOINTMENT (OUTPATIENT)
Dept: PULMONOLOGY | Facility: CLINIC | Age: 59
End: 2024-09-13
Payer: MEDICARE

## 2024-09-13 VITALS
SYSTOLIC BLOOD PRESSURE: 136 MMHG | WEIGHT: 166 LBS | OXYGEN SATURATION: 99 % | HEART RATE: 83 BPM | BODY MASS INDEX: 25.24 KG/M2 | DIASTOLIC BLOOD PRESSURE: 80 MMHG | TEMPERATURE: 97.5 F

## 2024-09-13 PROCEDURE — 94060 EVALUATION OF WHEEZING: CPT

## 2024-09-13 PROCEDURE — 99203 OFFICE O/P NEW LOW 30 MIN: CPT | Mod: 25

## 2024-09-13 PROCEDURE — 94727 GAS DIL/WSHOT DETER LNG VOL: CPT

## 2024-09-13 PROCEDURE — 94729 DIFFUSING CAPACITY: CPT

## 2024-09-13 NOTE — REASON FOR VISIT
[Consultation] : a consultation [Abnormal CXR/ Chest CT] : an abnormal CXR/ chest CT [Shortness of Breath] : shortness of breath

## 2024-09-14 NOTE — DISCUSSION/SUMMARY
[FreeTextEntry1] : 59-year-old male with history of CAD, CKD on HD complaining of SYLVETSER.  I reviewed the PFT results with the patient.  Despite suboptimal effort the overall function is normal.  Decreased diffusion may be related to effort.  I also reviewed the chest CT images recently performed online and discussed the findings with the patient.  The abnormalities noted appear to be chronic and stable. One must believe that the patient's SYLVESTER is due to extrapulmonary causes likely cardiac and fluid related.  Patient states that he is not compliant with diet.  At present there is no pulmonary contraindication for planned renal transplant.  He is to follow-up with his PMD as before.

## 2024-09-14 NOTE — DISCUSSION/SUMMARY
[FreeTextEntry1] : 59-year-old male with history of CAD, CKD on HD complaining of SYLVESTER.  I reviewed the PFT results with the patient.  Despite suboptimal effort the overall function is normal.  Decreased diffusion may be related to effort.  I also reviewed the chest CT images recently performed online and discussed the findings with the patient.  The abnormalities noted appear to be chronic and stable. One must believe that the patient's SYLVESTER is due to extrapulmonary causes likely cardiac and fluid related.  Patient states that he is not compliant with diet.  At present there is no pulmonary contraindication for planned renal transplant.  He is to follow-up with his PMD as before.

## 2024-09-14 NOTE — HISTORY OF PRESENT ILLNESS
[Never] : never [TextBox_4] : 59-year-old male presents for evaluation of abnormal chest CT findings associated with SYLVESTER over the last few months.  Patient denies cough, chest pain, hemoptysis, night sweats or weight loss.  He has a history of renal failure on hemodialysis for nearly 5 years.  The CT was performed as part of renal transplant evaluation.  He has used occasional albuterol over the last few months . He denies smoking.  He has a history of CAD with recent stents. [TextBox_29] : Denies snoring, daytime somnolence, apneic episodes, AM headaches

## 2024-09-14 NOTE — PHYSICAL EXAM
[No Acute Distress] : no acute distress [Normal Oropharynx] : normal oropharynx [Normal Appearance] : normal appearance [No Neck Mass] : no neck mass [Normal Rate/Rhythm] : normal rate/rhythm [Normal S1, S2] : normal s1, s2 [No Murmurs] : no murmurs [No Resp Distress] : no resp distress [Clear to Auscultation Bilaterally] : clear to auscultation bilaterally [No Abnormalities] : no abnormalities [Benign] : benign [No Clubbing] : no clubbing [No Cyanosis] : no cyanosis [No Edema] : no edema [FROM] : FROM [Normal Color/ Pigmentation] : normal color/ pigmentation [No Focal Deficits] : no focal deficits [Oriented x3] : oriented x3 [Normal Affect] : normal affect [TextBox_99] : Ambulates with cane [TextBox_105] : Right upper extremity AV HD graft

## 2024-09-14 NOTE — REVIEW OF SYSTEMS
[Cough] : no cough [Sputum] : no sputum [SOB on Exertion] : sob on exertion [Diabetes] : diabetes [Negative] : Psychiatric

## 2024-12-16 NOTE — H&P PST ADULT - PRIMARY CARE PROVIDER
CPM/PAT Evaluation       Name: Ligia Franklin (Ligia Franklin)  /Age: 1997/27 y.o.     In-Person       Chief Complaint: Breast cancer     HPI  Pleasant 28 y/o female presents with malignant neoplasm of lower-outer quadrant of right breast of female, estrogen receptor positive scheduled for right mag seed bracketed partial mastectomy with immediate reconstruction on 24. She had a B/L skin sparing mastectomy with right sentinel lymph node biopsy and implant breast reconstruction in . She recently had breast reconstruction revision in . During this procedure, specimens were taken that showed residual invasive carcinoma as well as DCIS.     Past Medical History:   Diagnosis Date    Abnormal glucose complicating pregnancy ([facility]) 2022    Abnormal glucose tolerance in pregnancy    Breast cancer (Multi) January    Breast cancer (Multi)     Delayed emergence from general anesthesia March    Encounter for screening for diabetes mellitus 2021    Encounter for screening for diabetes mellitus    Encounter for supervision of normal pregnancy, unspecified, unspecified trimester 2022    Prenatal care    Nonpurulent mastitis associated with the puerperium ([facility]) 2022    Mastitis, postpartum    PONV (postoperative nausea and vomiting) March    Unspecified disorders of lactation ([facility]) 2022    Lactation problem       Past Surgical History:   Procedure Laterality Date    BREAST BIOPSY  March    MASTECTOMY  March    OTHER SURGICAL HISTORY  2022    No history of surgery       Patient  reports being sexually active and has had partner(s) who are male. She reports using the following method of birth control/protection: I.U.D..    Family History   Problem Relation Name Age of Onset    Other (RIGHT VENTRICULAR DYSPLASIA) Mother      Cancer Father Dont know        No Known Allergies    Prior to Admission medications    Medication Sig Start Date End Date Taking?  Authorizing Provider   buPROPion XL (Wellbutrin XL) 300 mg 24 hr tablet Take 1 tablet (300 mg) by mouth once daily in the morning. Do not crush, chew, or split. 11/26/24 3/26/25  JENNIFER Ponce   copper (ParaGard T 380A) 380 square mm IUD by intrauterine route. PER DIRECTED    Historical Provider, MD   cyclobenzaprine (Flexeril) 5 mg tablet Take 1 tablet (5 mg) by mouth 3 times a day as needed for muscle spasms.  Patient not taking: Reported on 12/2/2024 11/12/24 12/10/24  [place]ora CARLOS Rich PA-C   oxybutynin XL (Ditropan-XL) 5 mg 24 hr tablet Take 1 tablet (5 mg) by mouth once daily. Do not crush, chew, or split.  Patient not taking: Reported on 12/2/2024 11/13/24 12/10/24  Medora CARLOS Rich PA-C        Constitutional: Negative for fever, chills, or sweats   ENMT: Negative for nasal discharge, congestion, ear pain, mouth pain, throat pain. Positive for contacts/glasses.   Respiratory: Negative for cough, wheezing, shortness of breath   Cardiac: Negative for chest pain, dyspnea on exertion, palpitations   Gastrointestinal: Negative for nausea, vomiting, diarrhea, constipation, abdominal pain  Genitourinary: Negative for dysuria, flank pain, frequency, hematuria   Musculoskeletal: Negative for decreased ROM, pain, swelling, weakness   Neurological: Negative for dizziness, confusion, headache  Psychiatric: Negative for mood changes   Skin: Negative for itching, rash, ulcer    Hematologic/Lymph: Negative for bruising, easy bleeding  Allergic/Immunologic: Negative itching, sneezing, swelling      Physical Exam  Vitals reviewed.   Constitutional:       Appearance: Normal appearance.   HENT:      Head: Normocephalic.      Mouth/Throat:      Mouth: Mucous membranes are moist.      Pharynx: Oropharynx is clear.   Eyes:      Pupils: Pupils are equal, round, and reactive to light.   Cardiovascular:      Rate and Rhythm: Normal rate and regular rhythm.      Heart sounds: Normal heart sounds.   Pulmonary:      Effort:  Pulmonary effort is normal.      Breath sounds: Normal breath sounds.   Abdominal:      General: Bowel sounds are normal.      Palpations: Abdomen is soft.   Musculoskeletal:         General: Normal range of motion.      Cervical back: Normal range of motion.   Skin:     General: Skin is warm and dry.   Neurological:      General: No focal deficit present.      Mental Status: She is alert and oriented to person, place, and time.   Psychiatric:         Mood and Affect: Mood normal.         Behavior: Behavior normal.          PAT AIRWAY:   Airway:     Mallampati::  II    Neck ROM::  Full  normal        Testing/Diagnostic:     Patient Specialist/PCP:   PCP: Rosa Maria Bruno     Visit Vitals  OB Status Unknown   Smoking Status Never       DASI Risk Score    No data to display       Caprini DVT Assessment    No data to display       Modified Frailty Index    No data to display       CHADS2 Stroke Risk  Current as of 2 days ago        N/A 3 to 100%: High Risk   2 to < 3%: Medium Risk   0 to < 2%: Low Risk     Last Change: N/A          This score determines the patient's risk of having a stroke if the patient has atrial fibrillation.        This score is not applicable to this patient. Components are not calculated.          Revised Cardiac Risk Index    No data to display       Apfel Simplified Score    No data to display       Risk Analysis Index Results This Encounter    No data found in the last 10 encounters.       Prodigy: High Risk  Total Score: 0          ARISCAT Score for Postoperative Pulmonary Complications    No data to display       Pedersen Perioperative Risk for Myocardial Infarction or Cardiac Arrest (NOHEMI)    No data to display         Assessment and Plan:     Assessment and Plan:     Preop:   OR with Dr. العلي on 12/20/24 for right mag seed bracketed partial mastectomy with immediate reconstruction  Labs on file from 11/16. Routed to Dr. العلي for her review   Urine preg ordered for DOS     Cardiac:  [facility]  Activity Status Index (DASI)  DASI Score: 50.7   MET Score: 9  RCRI  0 which is 3.9% 30 day risk of MACE (risk for cardiac death, nonfatal myocardial infarction, and nonfactal cardiac arrest)  NOHEMI score which indicates a   0% risk of intraoperative or 30-day postoperative MACE    Pulmonary:   STOP-BANG score of   0. Low  risk of obstructive sleep apnea.   ARISCAT:   0   points which is a low (1.6%) risk of in-hospital post-op pulmonary complications     GI:  Apfel: 4 points 79% risk for post operative N/V     General:   Breast cancer: Reason for upcoming procedure. See HPI    Hematologic:   Caprini score 6, patient at high risk for perioperative DVT. Patient provided with VTE education/handout.     Skin check: Patient was instructed to make surgeon aware of any skin changes/concerns prior to surgery.     Anesthesia: Has had post-op nausea/vomiting with anesthesia in the past. Delayed emergence from anesthesia in past as well-states she doesn't remember getting dressed or getting to car. Used scopolamine patch.     *See risk scores as previously documented    Dr Sharma [phone]

## 2025-03-07 ENCOUNTER — APPOINTMENT (OUTPATIENT)
Dept: NEPHROLOGY | Facility: CLINIC | Age: 60
End: 2025-03-07
Payer: COMMERCIAL

## 2025-03-07 ENCOUNTER — NON-APPOINTMENT (OUTPATIENT)
Age: 60
End: 2025-03-07

## 2025-03-07 ENCOUNTER — LABORATORY RESULT (OUTPATIENT)
Age: 60
End: 2025-03-07

## 2025-03-07 VITALS
RESPIRATION RATE: 15 BRPM | BODY MASS INDEX: 24.25 KG/M2 | SYSTOLIC BLOOD PRESSURE: 160 MMHG | TEMPERATURE: 97.1 F | DIASTOLIC BLOOD PRESSURE: 74 MMHG | WEIGHT: 160 LBS | HEART RATE: 90 BPM | HEIGHT: 68 IN | OXYGEN SATURATION: 96 %

## 2025-03-07 VITALS — DIASTOLIC BLOOD PRESSURE: 70 MMHG | SYSTOLIC BLOOD PRESSURE: 140 MMHG

## 2025-03-07 DIAGNOSIS — E11.9 TYPE 2 DIABETES MELLITUS W/OUT COMPLICATIONS: ICD-10-CM

## 2025-03-07 DIAGNOSIS — I10 ESSENTIAL (PRIMARY) HYPERTENSION: ICD-10-CM

## 2025-03-07 DIAGNOSIS — Z01.818 ENCOUNTER FOR OTHER PREPROCEDURAL EXAMINATION: ICD-10-CM

## 2025-03-07 DIAGNOSIS — Z99.2 END STAGE RENAL DISEASE: ICD-10-CM

## 2025-03-07 DIAGNOSIS — N18.6 END STAGE RENAL DISEASE: ICD-10-CM

## 2025-03-07 LAB
ABORH: NORMAL
ALBUMIN SERPL ELPH-MCNC: 4.1 G/DL
ALP BLD-CCNC: 147 U/L
ALT SERPL-CCNC: 11 U/L
ANION GAP SERPL CALC-SCNC: 15 MMOL/L
APPEARANCE: CLEAR
AST SERPL-CCNC: 13 U/L
BILIRUB SERPL-MCNC: 0.2 MG/DL
BILIRUBIN URINE: NEGATIVE
BLOOD URINE: NEGATIVE
BUN SERPL-MCNC: 30 MG/DL
C PEPTIDE SERPL-MCNC: 16.9 NG/ML
CALCIUM SERPL-MCNC: 9.3 MG/DL
CHLORIDE SERPL-SCNC: 97 MMOL/L
CHOLEST SERPL-MCNC: 149 MG/DL
CK SERPL-CCNC: 31 U/L
CO2 SERPL-SCNC: 29 MMOL/L
COLOR: YELLOW
COVID-19 SPIKE DOMAIN ANTIBODY INTERPRETATION: POSITIVE
CREAT SERPL-MCNC: 6.27 MG/DL
CREAT SPEC-SCNC: 69 MG/DL
CREAT/PROT UR: 7.9 RATIO
CRP SERPL-MCNC: 7 MG/L
EGFRCR SERPLBLD CKD-EPI 2021: 10 ML/MIN/1.73M2
ERYTHROCYTE [SEDIMENTATION RATE] IN BLOOD BY WESTERGREN METHOD: 90 MM/HR
ESTIMATED AVERAGE GLUCOSE: 126 MG/DL
GLUCOSE QUALITATIVE U: 250 MG/DL
GLUCOSE SERPL-MCNC: 183 MG/DL
HBA1C MFR BLD HPLC: 6 %
HCT VFR BLD CALC: 33.1 %
HDLC SERPL-MCNC: 36 MG/DL
HGB BLD-MCNC: 10.7 G/DL
KETONES URINE: NEGATIVE MG/DL
LDLC SERPL CALC-MCNC: 79 MG/DL
LEUKOCYTE ESTERASE URINE: NEGATIVE
MAGNESIUM SERPL-MCNC: 2 MG/DL
MCHC RBC-ENTMCNC: 29.8 PG
MCHC RBC-ENTMCNC: 32.3 G/DL
MCV RBC AUTO: 92.2 FL
NITRITE URINE: NEGATIVE
NONHDLC SERPL-MCNC: 112 MG/DL
PARATHYROID HORMONE INTACT: 602 PG/ML
PH URINE: 8.5
PHOSPHATE SERPL-MCNC: 4.3 MG/DL
PLATELET # BLD AUTO: 409 K/UL
POTASSIUM SERPL-SCNC: 4.3 MMOL/L
PROT SERPL-MCNC: 7.3 G/DL
PROT UR-MCNC: 549 MG/DL
PROTEIN URINE: 300 MG/DL
PSA SERPL-MCNC: 1.23 NG/ML
RBC # BLD: 3.59 M/UL
RBC # FLD: 13.5 %
SARS-COV-2 AB SERPL IA-ACNC: >250 U/ML
SODIUM SERPL-SCNC: 141 MMOL/L
SPECIFIC GRAVITY URINE: 1.02
T3 SERPL-MCNC: 102 NG/DL
T4 FREE SERPL-MCNC: 1.4 NG/DL
TRIGL SERPL-MCNC: 195 MG/DL
TSH SERPL-ACNC: 1.46 UIU/ML
URATE SERPL-MCNC: 4.5 MG/DL
UROBILINOGEN URINE: 0.2 MG/DL
WBC # FLD AUTO: 9.12 K/UL

## 2025-03-07 PROCEDURE — 99215 OFFICE O/P EST HI 40 MIN: CPT

## 2025-03-09 LAB
CMV IGG SERPL QL: >10 U/ML
CMV IGG SERPL-IMP: POSITIVE
EBV DNA SERPL NAA+PROBE-ACNC: NOT DETECTED IU/ML
EBV EA AB SER IA-ACNC: <5 U/ML
EBV EA AB TITR SER IF: POSITIVE
EBV EA IGG SER QL IA: >600 U/ML
EBV EA IGG SER-ACNC: NEGATIVE
EBV EA IGM SER IA-ACNC: NEGATIVE
EBV PATRN SPEC IB-IMP: NORMAL
EBV VCA IGG SER IA-ACNC: 598 U/ML
EBV VCA IGM SER QL IA: <10 U/ML
EBVPCR LOG: NOT DETECTED LOG10IU/ML
EPSTEIN-BARR VIRUS CAPSID ANTIGEN IGG: POSITIVE
HBV CORE IGG+IGM SER QL: NONREACTIVE
HBV SURFACE AB SER QL: REACTIVE
HBV SURFACE AB SERPL IA-ACNC: 14.2 MIU/ML
HBV SURFACE AG SER QL: NONREACTIVE
HCV AB SER QL: NONREACTIVE
HCV S/CO RATIO: 0.19 S/CO
HEPATITIS A IGG ANTIBODY: REACTIVE
HIV1+2 AB SPEC QL IA.RAPID: NONREACTIVE
HSV 1+2 IGG SER IA-IMP: NEGATIVE
HSV 1+2 IGG SER IA-IMP: POSITIVE
HSV 1+2 IGG SER IA-IMP: POSITIVE
HSV1 IGG SER QL: 34.5 INDEX
HSV1 IGG SER QL: 34.5 INDEX
HSV2 IGG SER QL: 0.08 INDEX
MEV IGG FLD QL IA: >300 AU/ML
MEV IGG+IGM SER-IMP: POSITIVE
RUBV IGG FLD-ACNC: 22.6 INDEX
RUBV IGG SER-IMP: POSITIVE
T GONDII AB SER-IMP: POSITIVE
T GONDII IGG SER QL: 19.6 IU/ML
T PALLIDUM AB SER QL IA: NEGATIVE
VZV AB TITR SER: POSITIVE
VZV IGG SER IF-ACNC: 9.89 S/CO

## 2025-03-10 LAB — T CRUZI AB SER-ACNC: 0.2 IV

## 2025-03-11 LAB — STRONGYLOIDES AB SER IA-ACNC: NEGATIVE

## 2025-03-12 LAB
M TB IFN-G BLD-IMP: NEGATIVE
QUANTIFERON TB PLUS MITOGEN MINUS NIL: >10 IU/ML
QUANTIFERON TB PLUS NIL: 0.03 IU/ML
QUANTIFERON TB PLUS TB1 MINUS NIL: 0 IU/ML
QUANTIFERON TB PLUS TB2 MINUS NIL: 0 IU/ML

## 2025-03-13 ENCOUNTER — APPOINTMENT (OUTPATIENT)
Dept: PULMONOLOGY | Facility: CLINIC | Age: 60
End: 2025-03-13

## 2025-03-13 ENCOUNTER — NON-APPOINTMENT (OUTPATIENT)
Age: 60
End: 2025-03-13

## 2025-03-13 VITALS
TEMPERATURE: 98 F | HEIGHT: 68 IN | WEIGHT: 160 LBS | HEART RATE: 79 BPM | OXYGEN SATURATION: 95 % | DIASTOLIC BLOOD PRESSURE: 64 MMHG | BODY MASS INDEX: 24.25 KG/M2 | SYSTOLIC BLOOD PRESSURE: 138 MMHG

## 2025-03-13 PROCEDURE — 99214 OFFICE O/P EST MOD 30 MIN: CPT

## 2025-03-18 ENCOUNTER — NON-APPOINTMENT (OUTPATIENT)
Age: 60
End: 2025-03-18

## 2025-03-19 ENCOUNTER — APPOINTMENT (OUTPATIENT)
Dept: GASTROENTEROLOGY | Facility: CLINIC | Age: 60
End: 2025-03-19
Payer: COMMERCIAL

## 2025-03-19 VITALS
DIASTOLIC BLOOD PRESSURE: 72 MMHG | RESPIRATION RATE: 16 BRPM | SYSTOLIC BLOOD PRESSURE: 122 MMHG | HEART RATE: 78 BPM | HEIGHT: 68 IN | WEIGHT: 159 LBS | BODY MASS INDEX: 24.1 KG/M2 | OXYGEN SATURATION: 95 %

## 2025-03-19 DIAGNOSIS — Z12.11 ENCOUNTER FOR SCREENING FOR MALIGNANT NEOPLASM OF COLON: ICD-10-CM

## 2025-03-19 DIAGNOSIS — Z86.0100 PERSONAL HISTORY OF COLON POLYPS, UNSPECIFIED: ICD-10-CM

## 2025-03-19 PROCEDURE — 99213 OFFICE O/P EST LOW 20 MIN: CPT

## 2025-04-02 ENCOUNTER — NON-APPOINTMENT (OUTPATIENT)
Age: 60
End: 2025-04-02

## 2025-04-02 ENCOUNTER — APPOINTMENT (OUTPATIENT)
Dept: CARDIOLOGY | Facility: CLINIC | Age: 60
End: 2025-04-02

## 2025-04-02 VITALS
BODY MASS INDEX: 24.25 KG/M2 | DIASTOLIC BLOOD PRESSURE: 78 MMHG | SYSTOLIC BLOOD PRESSURE: 142 MMHG | WEIGHT: 160 LBS | HEIGHT: 68 IN | HEART RATE: 77 BPM | OXYGEN SATURATION: 97 %

## 2025-04-02 DIAGNOSIS — Z99.2 END STAGE RENAL DISEASE: ICD-10-CM

## 2025-04-02 DIAGNOSIS — Z01.818 ENCOUNTER FOR OTHER PREPROCEDURAL EXAMINATION: ICD-10-CM

## 2025-04-02 DIAGNOSIS — I10 ESSENTIAL (PRIMARY) HYPERTENSION: ICD-10-CM

## 2025-04-02 DIAGNOSIS — N18.6 END STAGE RENAL DISEASE: ICD-10-CM

## 2025-04-02 DIAGNOSIS — I25.10 ATHEROSCLEROTIC HEART DISEASE OF NATIVE CORONARY ARTERY W/OUT ANGINA PECTORIS: ICD-10-CM

## 2025-04-02 PROCEDURE — 93000 ELECTROCARDIOGRAM COMPLETE: CPT | Mod: NC

## 2025-04-02 PROCEDURE — G2211 COMPLEX E/M VISIT ADD ON: CPT

## 2025-04-02 PROCEDURE — 99214 OFFICE O/P EST MOD 30 MIN: CPT

## 2025-04-07 RX ORDER — NIFEDIPINE 90 MG/1
90 TABLET, EXTENDED RELEASE ORAL
Qty: 90 | Refills: 0 | Status: ACTIVE | COMMUNITY
Start: 2025-04-07

## 2025-04-07 RX ORDER — ISOSORBIDE MONONITRATE 30 MG/1
30 TABLET, EXTENDED RELEASE ORAL DAILY
Qty: 30 | Refills: 1 | Status: ACTIVE | COMMUNITY
Start: 2025-04-07

## 2025-04-07 RX ORDER — FERRIC CITRATE 210 MG/1
1 GM TABLET, COATED ORAL
Refills: 0 | Status: ACTIVE | COMMUNITY
Start: 2025-04-07

## 2025-04-07 RX ORDER — SITAGLIPTIN 25 MG/1
25 TABLET, FILM COATED ORAL
Qty: 90 | Refills: 0 | Status: ACTIVE | COMMUNITY
Start: 2025-04-07

## 2025-04-11 ENCOUNTER — NON-APPOINTMENT (OUTPATIENT)
Age: 60
End: 2025-04-11

## 2025-04-18 ENCOUNTER — APPOINTMENT (OUTPATIENT)
Dept: ULTRASOUND IMAGING | Facility: CLINIC | Age: 60
End: 2025-04-18
Payer: COMMERCIAL

## 2025-04-18 PROCEDURE — 93880 EXTRACRANIAL BILAT STUDY: CPT

## 2025-04-25 ENCOUNTER — APPOINTMENT (OUTPATIENT)
Dept: VASCULAR SURGERY | Facility: CLINIC | Age: 60
End: 2025-04-25
Payer: MEDICARE

## 2025-04-25 VITALS
BODY MASS INDEX: 24.25 KG/M2 | OXYGEN SATURATION: 97 % | HEART RATE: 76 BPM | TEMPERATURE: 98.1 F | DIASTOLIC BLOOD PRESSURE: 76 MMHG | HEIGHT: 68 IN | WEIGHT: 160 LBS | SYSTOLIC BLOOD PRESSURE: 146 MMHG

## 2025-04-25 PROCEDURE — 99213 OFFICE O/P EST LOW 20 MIN: CPT

## 2025-04-28 ENCOUNTER — APPOINTMENT (OUTPATIENT)
Dept: CARDIOLOGY | Facility: CLINIC | Age: 60
End: 2025-04-28
Payer: COMMERCIAL

## 2025-04-28 PROCEDURE — 93306 TTE W/DOPPLER COMPLETE: CPT

## 2025-05-28 ENCOUNTER — OUTPATIENT (OUTPATIENT)
Dept: OUTPATIENT SERVICES | Facility: HOSPITAL | Age: 60
LOS: 1 days | End: 2025-05-28
Payer: COMMERCIAL

## 2025-05-28 ENCOUNTER — TRANSCRIPTION ENCOUNTER (OUTPATIENT)
Age: 60
End: 2025-05-28

## 2025-05-28 VITALS
RESPIRATION RATE: 18 BRPM | SYSTOLIC BLOOD PRESSURE: 158 MMHG | OXYGEN SATURATION: 96 % | HEART RATE: 87 BPM | DIASTOLIC BLOOD PRESSURE: 76 MMHG

## 2025-05-28 VITALS
HEIGHT: 68 IN | RESPIRATION RATE: 18 BRPM | WEIGHT: 169.98 LBS | SYSTOLIC BLOOD PRESSURE: 178 MMHG | DIASTOLIC BLOOD PRESSURE: 89 MMHG | HEART RATE: 82 BPM | TEMPERATURE: 97 F | OXYGEN SATURATION: 100 %

## 2025-05-28 DIAGNOSIS — Z98.890 OTHER SPECIFIED POSTPROCEDURAL STATES: Chronic | ICD-10-CM

## 2025-05-28 DIAGNOSIS — Z95.5 PRESENCE OF CORONARY ANGIOPLASTY IMPLANT AND GRAFT: Chronic | ICD-10-CM

## 2025-05-28 DIAGNOSIS — H26.9 UNSPECIFIED CATARACT: Chronic | ICD-10-CM

## 2025-05-28 DIAGNOSIS — Z95.828 PRESENCE OF OTHER VASCULAR IMPLANTS AND GRAFTS: Chronic | ICD-10-CM

## 2025-05-28 DIAGNOSIS — R93.1 ABNORMAL FINDINGS ON DIAGNOSTIC IMAGING OF HEART AND CORONARY CIRCULATION: ICD-10-CM

## 2025-05-28 LAB
ANION GAP SERPL CALC-SCNC: 21 MMOL/L — HIGH (ref 5–17)
BUN SERPL-MCNC: 34 MG/DL — HIGH (ref 7–23)
CALCIUM SERPL-MCNC: 9.7 MG/DL — SIGNIFICANT CHANGE UP (ref 8.4–10.5)
CHLORIDE SERPL-SCNC: 92 MMOL/L — LOW (ref 96–108)
CO2 SERPL-SCNC: 19 MMOL/L — LOW (ref 22–31)
CREAT SERPL-MCNC: 7.24 MG/DL — HIGH (ref 0.5–1.3)
EGFR: 8 ML/MIN/1.73M2 — LOW
EGFR: 8 ML/MIN/1.73M2 — LOW
GLUCOSE SERPL-MCNC: 96 MG/DL — SIGNIFICANT CHANGE UP (ref 70–99)
HCT VFR BLD CALC: 37.9 % — LOW (ref 39–50)
HGB BLD-MCNC: 12.2 G/DL — LOW (ref 13–17)
MAGNESIUM SERPL-MCNC: 2.3 MG/DL — SIGNIFICANT CHANGE UP (ref 1.6–2.6)
MCHC RBC-ENTMCNC: 29.9 PG — SIGNIFICANT CHANGE UP (ref 27–34)
MCHC RBC-ENTMCNC: 32.2 G/DL — SIGNIFICANT CHANGE UP (ref 32–36)
MCV RBC AUTO: 92.9 FL — SIGNIFICANT CHANGE UP (ref 80–100)
NRBC BLD AUTO-RTO: 0 /100 WBCS — SIGNIFICANT CHANGE UP (ref 0–0)
PLATELET # BLD AUTO: 327 K/UL — SIGNIFICANT CHANGE UP (ref 150–400)
POTASSIUM SERPL-MCNC: 5.2 MMOL/L — SIGNIFICANT CHANGE UP (ref 3.5–5.3)
POTASSIUM SERPL-SCNC: 5.2 MMOL/L — SIGNIFICANT CHANGE UP (ref 3.5–5.3)
RBC # BLD: 4.08 M/UL — LOW (ref 4.2–5.8)
RBC # FLD: 14 % — SIGNIFICANT CHANGE UP (ref 10.3–14.5)
SODIUM SERPL-SCNC: 132 MMOL/L — LOW (ref 135–145)
WBC # BLD: 9.34 K/UL — SIGNIFICANT CHANGE UP (ref 3.8–10.5)
WBC # FLD AUTO: 9.34 K/UL — SIGNIFICANT CHANGE UP (ref 3.8–10.5)

## 2025-05-28 PROCEDURE — 93458 L HRT ARTERY/VENTRICLE ANGIO: CPT

## 2025-05-28 PROCEDURE — 93005 ELECTROCARDIOGRAM TRACING: CPT

## 2025-05-28 PROCEDURE — 80048 BASIC METABOLIC PNL TOTAL CA: CPT

## 2025-05-28 PROCEDURE — C1894: CPT

## 2025-05-28 PROCEDURE — C1769: CPT

## 2025-05-28 PROCEDURE — 93010 ELECTROCARDIOGRAM REPORT: CPT

## 2025-05-28 PROCEDURE — 85027 COMPLETE CBC AUTOMATED: CPT

## 2025-05-28 PROCEDURE — C1887: CPT

## 2025-05-28 PROCEDURE — 83735 ASSAY OF MAGNESIUM: CPT

## 2025-05-28 PROCEDURE — 93458 L HRT ARTERY/VENTRICLE ANGIO: CPT | Mod: 26

## 2025-05-28 PROCEDURE — 99152 MOD SED SAME PHYS/QHP 5/>YRS: CPT

## 2025-05-28 RX ORDER — FUROSEMIDE 10 MG/ML
1 INJECTION INTRAMUSCULAR; INTRAVENOUS
Refills: 0 | DISCHARGE

## 2025-05-28 NOTE — H&P CARDIOLOGY - HISTORY OF PRESENT ILLNESS
60 year old male with PMHx of MI s/p urgent PCI and subsequent CVA with residual right sided hemiparesis, HTN, HLD, DM, ESRD on HD (T/Th/Sat), stenosis of bilateral carotid arteries,     Patient now here for [facility] w/ Dr. Thompson for pre-transplant cardiovascular evaluation in anticipation of renal transplant.    Cards: Zenaida Lomas 60 year old male with PMHx of MI s/p urgent PCI and subsequent CVA with residual right sided hemiparesis, HTN, HLD, DM (diet controlled), ESRD on HD (T/Th/Sat), stenosis of bilateral carotid arteries, presented to his cardiologist Dr. Lomas for a routine appointment and was found to have an abnormal stress test showing small sized, moderate defects in the prox to mid inferolateral wall that is fixed consistent with an infarction with minimal river-infarct ischemia. Patient now here for [facility] w/ Dr. Thompson for pre-transplant cardiovascular evaluation in anticipation of renal transplant. Currently denies chest pain, palpitations, SOB, dizziness.    Cards: Zenaida Lomas

## 2025-05-28 NOTE — ASU PATIENT PROFILE, ADULT - REASON FOR ADMISSION, PROFILE
[email or web address] carrero Consent (Ear)/Introductory Paragraph: The rationale for Mohs was explained to the patient and consent was obtained. The risks, benefits and alternatives to therapy were discussed in detail. Specifically, the risks of ear deformity, infection, scarring, bleeding, prolonged wound healing, incomplete removal, allergy to anesthesia, nerve injury and recurrence were addressed. Prior to the procedure, the treatment site was clearly identified and confirmed by the patient. All components of Universal Protocol/PAUSE Rule completed.

## 2025-05-28 NOTE — ASU DISCHARGE PLAN (ADULT/PEDIATRIC) - CARE PROVIDER_API CALL
Zenaida Lomas  NP in Family Health  [facility], [facility] [address]  [place] [address]  Phone: [phone]  Fax: [phone]  Established Patient  Follow Up Time: 1 month

## 2025-05-28 NOTE — ASU DISCHARGE PLAN (ADULT/PEDIATRIC) - FINANCIAL ASSISTANCE
[facility] provides services at a reduced cost to those who are determined to be eligible through [facility]’s financial assistance program. Information regarding [facility]’s financial assistance program can be found by going to [email or web address] or by calling [phone].

## 2025-06-02 ENCOUNTER — APPOINTMENT (OUTPATIENT)
Dept: GASTROENTEROLOGY | Facility: CLINIC | Age: 60
End: 2025-06-02
Payer: MEDICARE

## 2025-06-02 VITALS
OXYGEN SATURATION: 99 % | WEIGHT: 163 LBS | TEMPERATURE: 97 F | SYSTOLIC BLOOD PRESSURE: 130 MMHG | BODY MASS INDEX: 24.71 KG/M2 | HEART RATE: 77 BPM | DIASTOLIC BLOOD PRESSURE: 70 MMHG | RESPIRATION RATE: 16 BRPM | HEIGHT: 68 IN

## 2025-06-02 DIAGNOSIS — I25.10 ATHEROSCLEROTIC HEART DISEASE OF NATIVE CORONARY ARTERY W/OUT ANGINA PECTORIS: ICD-10-CM

## 2025-06-02 DIAGNOSIS — Z86.0100 PERSONAL HISTORY OF COLON POLYPS, UNSPECIFIED: ICD-10-CM

## 2025-06-02 PROCEDURE — G2211 COMPLEX E/M VISIT ADD ON: CPT

## 2025-06-02 PROCEDURE — 99203 OFFICE O/P NEW LOW 30 MIN: CPT

## 2025-06-19 ENCOUNTER — NON-APPOINTMENT (OUTPATIENT)
Age: 60
End: 2025-06-19

## 2025-07-11 NOTE — H&P PST ADULT - SYMPTOMS
Prednisone 20 mg   Take 3 tablets by mouth daily for 2 days, 2 tablets daily for 2 days, 1 tablet daily for 2 days.  Then stop.  8/2023     Last given 4/17/24 through the ED, but with different instructions.      Ok to represcribe?   [email or web address]